# Patient Record
Sex: FEMALE | Race: WHITE | NOT HISPANIC OR LATINO | Employment: FULL TIME | URBAN - METROPOLITAN AREA
[De-identification: names, ages, dates, MRNs, and addresses within clinical notes are randomized per-mention and may not be internally consistent; named-entity substitution may affect disease eponyms.]

---

## 2017-01-06 ENCOUNTER — HOSPITAL ENCOUNTER (EMERGENCY)
Facility: HOSPITAL | Age: 27
Discharge: HOME | End: 2017-01-07
Attending: EMERGENCY MEDICINE
Payer: MEDICAID

## 2017-01-06 DIAGNOSIS — S51.851A CAT BITE OF FOREARM, RIGHT, INITIAL ENCOUNTER: Primary | ICD-10-CM
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
## 2017-01-06 PROCEDURE — 90675 RABIES VACCINE IM: CPT | Performed by: EMERGENCY MEDICINE

## 2017-01-06 PROCEDURE — 96372 THER/PROPH/DIAG INJ SC/IM: CPT

## 2017-01-06 PROCEDURE — 90375 RABIES IG IM/SC: CPT | Performed by: EMERGENCY MEDICINE

## 2017-01-06 PROCEDURE — 90715 TDAP VACCINE 7 YRS/> IM: CPT | Performed by: EMERGENCY MEDICINE

## 2017-01-06 PROCEDURE — 25000003 PHARM REV CODE 250: Performed by: EMERGENCY MEDICINE

## 2017-01-06 PROCEDURE — 90471 IMMUNIZATION ADMIN: CPT | Performed by: EMERGENCY MEDICINE

## 2017-01-06 PROCEDURE — 90472 IMMUNIZATION ADMIN EACH ADD: CPT | Performed by: EMERGENCY MEDICINE

## 2017-01-06 PROCEDURE — 99284 EMERGENCY DEPT VISIT MOD MDM: CPT | Mod: 25

## 2017-01-06 PROCEDURE — 63600175 PHARM REV CODE 636 W HCPCS: Performed by: EMERGENCY MEDICINE

## 2017-01-06 RX ORDER — TRAMADOL HYDROCHLORIDE AND ACETAMINOPHEN 37.5; 325 MG/1; MG/1
1 TABLET, FILM COATED ORAL EVERY 6 HOURS PRN
Qty: 12 TABLET | Refills: 0 | Status: SHIPPED | OUTPATIENT
Start: 2017-01-06 | End: 2017-01-16

## 2017-01-06 RX ORDER — AMOXICILLIN AND CLAVULANATE POTASSIUM 875; 125 MG/1; MG/1
1 TABLET, FILM COATED ORAL EVERY 12 HOURS
Qty: 14 TABLET | Refills: 0 | Status: SHIPPED | OUTPATIENT
Start: 2017-01-06 | End: 2017-01-13

## 2017-01-06 RX ORDER — AMOXICILLIN AND CLAVULANATE POTASSIUM 875; 125 MG/1; MG/1
1 TABLET, FILM COATED ORAL
Status: COMPLETED | OUTPATIENT
Start: 2017-01-06 | End: 2017-01-06

## 2017-01-06 RX ORDER — IBUPROFEN 200 MG
400 TABLET ORAL
Status: COMPLETED | OUTPATIENT
Start: 2017-01-06 | End: 2017-01-06

## 2017-01-06 RX ADMIN — IBUPROFEN 400 MG: 200 TABLET, FILM COATED ORAL at 10:01

## 2017-01-06 RX ADMIN — AMOXICILLIN AND CLAVULANATE POTASSIUM 1 TABLET: 875; 125 TABLET, FILM COATED ORAL at 10:01

## 2017-01-06 RX ADMIN — RABIES IMMUNE GLOBULIN (HUMAN) 1380 UNITS: 150 INJECTION INTRAMUSCULAR at 11:01

## 2017-01-06 RX ADMIN — RABIES VACCINE 2.5 UNITS: KIT at 11:01

## 2017-01-06 RX ADMIN — CLOSTRIDIUM TETANI TOXOID ANTIGEN (FORMALDEHYDE INACTIVATED), CORYNEBACTERIUM DIPHTHERIAE TOXOID ANTIGEN (FORMALDEHYDE INACTIVATED), BORDETELLA PERTUSSIS TOXOID ANTIGEN (GLUTARALDEHYDE INACTIVATED), BORDETELLA PERTUSSIS FILAMENTOUS HEMAGGLUTININ ANTIGEN (FORMALDEHYDE INACTIVATED), BORDETELLA PERTUSSIS PERTACTIN ANTIGEN, AND BORDETELLA PERTUSSIS FIMBRIAE 2/3 ANTIGEN 0.5 ML: 5; 2; 2.5; 5; 3; 5 INJECTION, SUSPENSION INTRAMUSCULAR at 10:01

## 2017-01-06 NOTE — ED AVS SNAPSHOT
[facility]  [address] [address] [address]               Alessandra Sy   2017 10:08 PM   ED    Description:  Female : 1990   Department:  [facility]           Your Care was Coordinated By:     Provider Role From To    Kennedy Singh MD Attending Provider [phone] --      Reason for Visit     Animal Bite           Diagnoses this Visit        Comments    Cat bite of forearm, right, initial encounter    -  Primary       ED Disposition     ED Disposition Condition Comment    Discharge  Make sure to follow up with your primary care provider for further injections of the rabies vaccination as scheduled:  Monday -  -  - 2017    The first symptoms of rabies may be very similar to t hose of the flu including general weakness or discomfort, fever, or headache. These symptoms may last for days. There may be also discomfort or a prickling or itching sensation at the site of bite, progressing within days to symptoms of cerebral dysfunct ion, anxiety, confusion, agitation. As the disease progresses, the person may experience delirium, abnormal behavior, hallucinations, and insomnia.    The acute period of disease typically ends after 2 to 10 days. Once clinical signs of rabies appear, th e disease is nearly always fatal, and treatment is typically supportive.    Disease prevention includes administration of both passive antibody, through an injection of human immune globulin and a round of injections with rabies vaccine (IT'S IMPORTANT T HAT YOU NOTICE THOSE DATES ABOVE TO COMPLETE THE FULL ROUND OF VACCINATIONS).    Once a person begins to exhibit signs of the disease, survival is rare. To date less than 10 documented cases of human survival from clinical rabies have been reported and o nly two have not had a history of pre- or postexposure prophylaxis.             To Do List            Follow-up Information     Follow up with Primary Doctor No In 3 days.        Follow up with [facility].    Specialty:  Emergency Medicine    Why:  If symptoms worsen    Contact information:    [address] [address] [address] [phone]       These Medications        Disp Refills Start End    amoxicillin-clavulanate 875-125mg (AUGMENTIN) 875-125 mg per tablet 14 tablet 0 1/6/2017 1/13/2017    Take 1 tablet by mouth every 12 (twelve) hours. - Oral    Pharmacy: "[facility] [id number] [place] [address] - [address] AT [facility] Ph #: [phone]       tramadol-acetaminophen 37.5-325 mg (ULTRACET) 37.5-325 mg Tab 12 tablet 0 1/6/2017 1/16/2017    Take 1 tablet by mouth every 6 (six) hours as needed for Pain. - Oral    [facility] [id number] [place] [address] - [address] AT [facility] Ph #: [phone]         [facility]     [facility] - 24/7 Assistance  Registered nurses in the [facility] provide clinical advisement, health education, appointment booking, and other advisory services.  Call for this free service at [phone].             Medications           Message regarding Medications     Verify the changes and/or additions to your medication regime listed below are the same as discussed with your clinician today.  If any of these changes or additions are incorrect, please notify your healthcare provider.        START taking these NEW medications        Refills    amoxicillin-clavulanate 875-125mg (AUGMENTIN) 875-125 mg per tablet 0    Sig: Take 1 tablet by mouth every 12 (twelve) hours.    Class: Print    Route: Oral    tramadol-acetaminophen 37.5-325 mg (ULTRACET) 37.5-325 mg Tab 0    Sig: Take 1 tablet by mouth every 6 (six) hours as needed for Pain.    Class: Print    Route: Oral      These medications were administered today        Dose Freq    Tdap vaccine  injection 0.5 mL 0.5 mL ED 1 Time    Sig: Inject 0.5 mLs into the muscle ED 1 Time.    Class: Normal    Route: Intramuscular    amoxicillin-clavulanate 875-125mg per tablet 1 tablet 1 tablet ED 1 Time    Sig: Take 1 tablet by mouth ED 1 Time.    Class: Normal    Route: Oral    ibuprofen tablet 400 mg 400 mg ED 1 Time    Sig: Take 2 tablets (400 mg total) by mouth ED 1 Time.    Class: Normal    Route: Oral    rabies vaccine, PCEC 2.5 unit injection 2.5 Units 1 mL ED 1 Time    Sig: Inject 1 mL (2.5 Units total) into the muscle ED 1 Time.    Class: Normal    Route: Intramuscular    rabies immune globulin (PF) 150 unit/mL injection 1,380 Units 20 Units/kg × 69 kg ED 1 Time    Sig: Inject 9.2 mLs (1,380 Units total) into the muscle ED 1 Time.    Class: Normal    Route: Intramuscular           Verify that the below list of medications is an accurate representation of the medications you are currently taking.  If none reported, the list may be blank. If incorrect, please contact your healthcare provider. Carry this list with you in case of emergency.           Current Medications     amoxicillin-clavulanate 875-125mg (AUGMENTIN) 875-125 mg per tablet Take 1 tablet by mouth every 12 (twelve) hours.    tramadol-acetaminophen 37.5-325 mg (ULTRACET) 37.5-325 mg Tab Take 1 tablet by mouth every 6 (six) hours as needed for Pain.           Clinical Reference Information           Your Vitals Were     BP Pulse Temp Resp Weight Last Period    124/74 (BP Location: Right arm, Patient Position: Sitting) 72 98.2 °F (36.8 °C) (Oral) 18 69 kg (152 lb 3.2 oz) 01/04/2017    SpO2                   98%           Allergies as of 1/6/2017     No Known Allergies      Immunizations Administered on Date of Encounter - 1/6/2017     Name Date Dose VIS Date Route    Rabies - IM 1/6/2017 2.5 Units 10/6/2009 Intramuscular    TDAP 1/6/2017 0.5 mL 2/24/2015 Intramuscular      ED Micro, Lab, POCT     None      ED Imaging Orders     None        Discharge  Instructions         Cat Bite    A cat bite can cause a wound deep enough to break the skin. In such cases, the wound is cleaned and then closed. Sometimes, the wound is not closed completely. This is so that fluid can drain if the wound becomes infected. In addition to wound care, a tetanus shot may be given, if needed.  Home Care  · Wash your hands well with soap and warm water before and after caring for the wound. This helps lower the risk of infection.  · Care for the wound as directed. If a dressing was applied to the wound, be sure to change it as directed.  · If the wound bleeds, place a clean, soft cloth on the wound. Then firmly apply pressure until the bleeding stops. This may take up to 5 minutes. Do not release the pressure and look at the wound during this time.  · Most wounds heal within 10 days. But an infection can occur even with proper treatment. So be sure to check the wound daily for signs of infection (see below).  · Antibiotics may be prescribed. These help prevent or treat infection. If youre given antibiotics, take them as directed. Also be sure to complete the medications.  Rabies Prevention  Rabies is a virus that can be carried in certain animals. These can include domestic animals such as cats and dogs. Pets fully vaccinated against rabies (2 shots) are at very low risk of infection. But because human rabies is almost always fatal, any biting pet should be confined for 10 days as an extra precaution. In general, if there is a risk for rabies, the following steps may need to be taken:  · If someones pet cat has bitten you, it should be kept in a secure area for the next 10 days to watch for signs of illness. (If the pet owner wont allow this, contact your local animal control center.) If the cat becomes ill or dies during that time, contact your local animal control center at once so the animal may be tested for rabies. If the cat stays healthy for the next 10 days, there is no danger  of rabies in the animal or you.  · If a stray cat bit you, contact your local animal control center. They can give information on capture, quarantine, and animal rabies testing.  · If you cant locate the animal that bit you in the next 2 days, and if rabies exists in your region, you may need to receive the rabies vaccine series. Call your health care provider right away. Or, return to the emergency department promptly.  · All animal bites should be reported to the local animal control center. If you were not given a form to fill out, you can report this yourself.  Follow-up care  Follow up with your health care provider, or as directed.  When to seek medical advice  Call your health care provider right away if any of these occur:  · Signs of infection:  ¨ Spreading redness or warmth from the wound  ¨ Increased pain or swelling  ¨ Fever of 100.4ºF (38ºC) or higher, or as directed by your health care provider  ¨ Colored fluid or pus draining from the wound  · Signs of rabies infection:  ¨ Headache  ¨ Confusion  ¨ Strange behavior  ¨ Seizure  · Decreased ability to move any body part near the bite area  · Bleeding that cannot be stopped after 5 minutes of firm pressure  © 6711-9336 [employer]. [address] [place] [address], [place] [address]. All rights reserved. This information is not intended as a substitute for professional medical care. Always follow your healthcare professional's instructions.        Rabies Immune Globulin Solution for injection  What is this medicine?  RABIES IMMUNE GLOBULIN (ray BEES im MYOON GLOB wojciech kenny) is used to prevent rabies infection. Rabies is mostly a disease of animals. Humans may get rabies if they are bitten by animals that have rabies. This medicine is given to someone after they have been exposed.  This medicine may be used for other purposes; ask your health care provider or pharmacist if you have questions.  What should I tell my health care provider before I take  this medicine?  They need to know if you have any of these conditions:  · bleeding disorder  · IgA deficiency  · recently received or scheduled to receive a vaccine  · take medicines that treat or prevent blood clots  · an unusual or allergic reaction to immune globulin, other medicines, foods, dyes, or preservatives  · pregnant or trying to get pregnant  · breast-feeding  How should I use this medicine?  This medicine is for injection into the area around a wound or into a muscle. It is given by a health care professional in a hospital or clinic setting.  A copy of Vaccine Information Statements will be given. Read this sheet carefully each time. The sheet may change frequently.  Talk to your pediatrician regarding the use of this medicine in children. While this drug may be prescribed for children and infants, precautions do apply.  Overdosage: If you think you've taken too much of this medicine contact a poison control center or emergency room at once.  NOTE: This medicine is only for you. Do not share this medicine with others.  What if I miss a dose?  This does not apply.  What may interact with this medicine?  · Live virus vaccines  This list may not describe all possible interactions. Give your health care provider a list of all the medicines, herbs, non-prescription drugs, or dietary supplements you use. Also tell them if you smoke, drink alcohol, or use illegal drugs. Some items may interact with your medicine.  What should I watch for while using this medicine?  This medicine can decrease the response to a vaccine. If you need to get vaccinated, tell your healthcare professional if you have received this medicine within the last 4 months. Extra booster doses may be needed. Talk to your doctor to see if a different vaccination schedule is needed.  This medicine contains products from human blood. It may be possible to pass an infection in this medicine, but no cases have been reported. Talk to your doctor  about the risks and benefits of this medicine.  Your condition will be monitored carefully while you are receiving this medicine.  What side effects may I notice from receiving this medicine?  Side effects that you should report to your doctor or health care professional as soon as possible:  · allergic reactions like skin rash, itching or hives, swelling of the face, lips, or tongue  Side effects that usually do not require medical attention (Report these to your doctor or health care professional if they continue or are bothersome.):  · fever  · headache  · pain, redness, swelling, or irritation at site where injected  This list may not describe all possible side effects. Call your doctor for medical advice about side effects. You may report side effects to FDA at 5-605-OWJ-2067.  Where should I keep my medicine?  This drug is given in a hospital or clinic and will not be stored at home.  NOTE: This sheet is a summary. It may not cover all possible information. If you have questions about this medicine, talk to your doctor, pharmacist, or health care provider.  NOTE:This sheet is a summary. It may not cover all possible information. If you have questions about this medicine, talk to your doctor, pharmacist, or health care provider. Copyright© 2016 [employer]        Understanding Rabies  Rabies is a virus that infects the nerves and the brain. Any warm-blooded animal, including humans, can get it. It is spread in the saliva of an infected animal. Rabies can be treated. Untreated rabies is almost always fatal.  What causes of rabies?  Infections occur when saliva or brain tissue from an infected animal enters another mammals body. This can be through a bite or scrape that breaks the skin. Rarely, infection can be spread through a mucous membrane, such as the eye or mouth.  In [place], raccoons, bats, foxes, and skunks are most likely to carry the rabies virus. Dogs, cats, and ferrets can also get rabies.  Many people have their pets vaccinated. Because of this, pets do not pose a high risk for rabies in this region. This is not the case in other parts of the world.  What are the symptoms of rabies?  In most cases, symptoms of rabies start 1 to 3 months after exposure.  But in some cases, they may not show up for more than a year. First symptoms may include:  · Feeling unwell  · Weakness  · Fatigue  · Low-grade fever  · Nausea or vomiting  · Headache  Over many days to a few weeks more symptoms may develop. These include tingling, itching, numbness, or a burning sensation where the bite occurred.  It is very important to talk with your healthcare provider right away if you think you have been exposed to rabies. Once these symptoms develop, the infection may be far advanced.  The end stages of rabies are almost always fatal. Symptoms at that point include restlessness and confusion, drooling, pain when trying to drink, muscle weakness, numbness, and paralysis.  What to do  · If you have been bitten or scratched by a wild animal, contact your healthcare provider right away.  · If you have been bitten or scratched by a pet, and you do not know if it has had a rabies vaccine, call your local animal control department or your local public health department. The animal will probably be confined with its owner for 10 days. If the animal does not develop rabies in that time, you are not at risk of getting sick.  How is rabies treated?  Treatments focus on reducing the risk of developing rabies after a bite or scratch. They include:  · Cleaning the wound thoroughly. The risk of infection can be reduced by washing the bite or scratch with soap and water, or water and iodine.  · Getting a tetanus vaccine, if you have not had one in 10 years or more. This is to prevent infection with tetanus. This is a bacterium that can cause another serious illness known as lockjaw.  · Getting a series of shots to prevent rabies infection.  These are usually given in the arm, like a flu shot. They include the rabies vaccine and the human rabies antibody to help your body fight the virus. This treatment can work even after rabies has entered the body, but not after symptoms begin.  What are the possible complications of rabies?  Rabies infections that are not treated almost always result in death. By the time symptoms of rabies appear, it is usually too late for treatment to work. If you think you have been exposed to rabies, it is very important to talk with your healthcare provider right away. He or she will tell you whether you should be treated.  How can I prevent rabies?   If you dont take unnecessary risks, you dont need to be afraid of getting rabies. To avoid exposure:  · Dont handle wild animals or stray dogs or cats. Report strays and ill animals to animal control.  · Vaccinate your pets. Urge your neighbors to do the same.  · Try to limit your pets exposure to wild animals.  · If you are traveling to other countries, ask your healthcare provider or a travel clinic about vaccines you should have.  When should I call my healthcare provider?  Call your healthcare provider right away if you have any of these:  · A bite or scratch from a wild animal that has broken the skin. This might be from a raccoon or skunk.  · A bite or scratch from a pet animal that has broken the skin, and you cannot be sure that it has had a current rabies vaccine.  · You or your child may have been scratched or bitten by a bat. Bites and scratches from bats may not be noticed, especially by children.   © 3708-3137 [employer]. [address] [place] [address], [place] [address]. All rights reserved. This information is not intended as a substitute for professional medical care. Always follow your healthcare professional's instructions.          Discharge References/Attachments     CAT BITE (ENGLISH)      [employer] Sign-Up     Activating your [facility] account is  as easy as 1-2-3!     1) Visit [email or web address], select Sign Up Now, enter this activation code and your date of birth, then select Next.  [email or web address]  Expires: 2/20/2017 11:46 PM      2) Create a username and password to use when you visit [facility] in the future and select a security question in case you lose your password and select Next.    3) Enter your e-mail address and click Sign Up!    Additional Information  If you have questions, please e-mail [email or web address] or call [phone] to talk to our [facility] staff. Remember, [facility] is NOT to be used for urgent needs. For medical emergencies, dial 911.         Smoking Cessation     If you would like to quit smoking:   You may be eligible for free services if you are a [place] resident and started smoking cigarettes before September 1, 1988.  Call the Smoking Cessation Trust ([facility]) toll free at (225) 388-9403 or [phone] 460-0009.   Call 7-110-QUIT-NOW if you do not meet the above criteria.             [facility] complies with applicable Federal civil rights laws and does not discriminate on the basis of race, color, national origin, age, disability, or sex.        Language Assistance Services     ATTENTION: Language assistance services are available, free of charge. Please call [phone].      ATENCIÓN: Si habla español, tiene a pacheco disposición servicios gratuitos de asistencia lingüística. Llame al [phone].     CHÚ Ý: N?u b?n nói Ti?ng Vi?t, có các d?ch v? h? tr? ngôn ng? mi?n phí dành cho b?n. G?i s? [phone].

## 2017-01-06 NOTE — Clinical Note
Make sure to follow up with your primary care provider for further injections of the rabies vaccination as scheduled:  Monday - January 9, 2017 Friday - January 13, 2017 Friday - January 20, 2017    The first symptoms of rabies may be very similar to t hose of the flu including general weakness or discomfort, fever, or headache. These symptoms may last for days. There may be also discomfort or a prickling or itching sensation at the site of bite, progressing within days to symptoms of cerebral dysfunct ion, anxiety, confusion, agitation. As the disease progresses, the person may experience delirium, abnormal behavior, hallucinations, and insomnia.    The acute period of disease typically ends after 2 to 10 days. Once clinical signs of rabies appear, th e disease is nearly always fatal, and treatment is typically supportive.    Disease prevention includes administration of both passive antibody, through an injection of human immune globulin and a round of injections with rabies vaccine (IT'S IMPORTANT T HAT YOU NOTICE THOSE DATES ABOVE TO COMPLETE THE FULL ROUND OF VACCINATIONS).    Once a person begins to exhibit signs of the disease, survival is rare. To date less than 10 documented cases of human survival from clinical rabies have been reported and o nly two have not had a history of pre- or postexposure prophylaxis.

## 2017-01-07 VITALS
HEART RATE: 72 BPM | SYSTOLIC BLOOD PRESSURE: 121 MMHG | DIASTOLIC BLOOD PRESSURE: 75 MMHG | WEIGHT: 152.19 LBS | TEMPERATURE: 98 F | OXYGEN SATURATION: 98 % | RESPIRATION RATE: 18 BRPM

## 2017-01-07 NOTE — ED NOTES
Pt reports mother cat and 2 kittens at [address] at the [facility] in [facility]. Bite occurred yesterday.

## 2017-01-07 NOTE — DISCHARGE INSTRUCTIONS
Cat Bite    A cat bite can cause a wound deep enough to break the skin. In such cases, the wound is cleaned and then closed. Sometimes, the wound is not closed completely. This is so that fluid can drain if the wound becomes infected. In addition to wound care, a tetanus shot may be given, if needed.  Home Care  · Wash your hands well with soap and warm water before and after caring for the wound. This helps lower the risk of infection.  · Care for the wound as directed. If a dressing was applied to the wound, be sure to change it as directed.  · If the wound bleeds, place a clean, soft cloth on the wound. Then firmly apply pressure until the bleeding stops. This may take up to 5 minutes. Do not release the pressure and look at the wound during this time.  · Most wounds heal within 10 days. But an infection can occur even with proper treatment. So be sure to check the wound daily for signs of infection (see below).  · Antibiotics may be prescribed. These help prevent or treat infection. If youre given antibiotics, take them as directed. Also be sure to complete the medications.  Rabies Prevention  Rabies is a virus that can be carried in certain animals. These can include domestic animals such as cats and dogs. Pets fully vaccinated against rabies (2 shots) are at very low risk of infection. But because human rabies is almost always fatal, any biting pet should be confined for 10 days as an extra precaution. In general, if there is a risk for rabies, the following steps may need to be taken:  · If someones pet cat has bitten you, it should be kept in a secure area for the next 10 days to watch for signs of illness. (If the pet owner wont allow this, contact your local animal control center.) If the cat becomes ill or dies during that time, contact your local animal control center at once so the animal may be tested for rabies. If the cat stays healthy for the next 10 days, there is no danger of rabies in the  animal or you.  · If a stray cat bit you, contact your local animal control center. They can give information on capture, quarantine, and animal rabies testing.  · If you cant locate the animal that bit you in the next 2 days, and if rabies exists in your region, you may need to receive the rabies vaccine series. Call your health care provider right away. Or, return to the emergency department promptly.  · All animal bites should be reported to the local animal control center. If you were not given a form to fill out, you can report this yourself.  Follow-up care  Follow up with your health care provider, or as directed.  When to seek medical advice  Call your health care provider right away if any of these occur:  · Signs of infection:  ¨ Spreading redness or warmth from the wound  ¨ Increased pain or swelling  ¨ Fever of 100.4ºF (38ºC) or higher, or as directed by your health care provider  ¨ Colored fluid or pus draining from the wound  · Signs of rabies infection:  ¨ Headache  ¨ Confusion  ¨ Strange behavior  ¨ Seizure  · Decreased ability to move any body part near the bite area  · Bleeding that cannot be stopped after 5 minutes of firm pressure  © 7485-5994 [employer]. [address] [place] [address]. All rights reserved. This information is not intended as a substitute for professional medical care. Always follow your healthcare professional's instructions.        Rabies Immune Globulin Solution for injection  What is this medicine?  RABIES IMMUNE GLOBULIN (ray BEES im MYOON GLOB wojciech kenny) is used to prevent rabies infection. Rabies is mostly a disease of animals. Humans may get rabies if they are bitten by animals that have rabies. This medicine is given to someone after they have been exposed.  This medicine may be used for other purposes; ask your health care provider or pharmacist if you have questions.  What should I tell my health care provider before I take this medicine?  They  need to know if you have any of these conditions:  · bleeding disorder  · IgA deficiency  · recently received or scheduled to receive a vaccine  · take medicines that treat or prevent blood clots  · an unusual or allergic reaction to immune globulin, other medicines, foods, dyes, or preservatives  · pregnant or trying to get pregnant  · breast-feeding  How should I use this medicine?  This medicine is for injection into the area around a wound or into a muscle. It is given by a health care professional in a hospital or clinic setting.  A copy of Vaccine Information Statements will be given. Read this sheet carefully each time. The sheet may change frequently.  Talk to your pediatrician regarding the use of this medicine in children. While this drug may be prescribed for children and infants, precautions do apply.  Overdosage: If you think you've taken too much of this medicine contact a poison control center or emergency room at once.  NOTE: This medicine is only for you. Do not share this medicine with others.  What if I miss a dose?  This does not apply.  What may interact with this medicine?  · Live virus vaccines  This list may not describe all possible interactions. Give your health care provider a list of all the medicines, herbs, non-prescription drugs, or dietary supplements you use. Also tell them if you smoke, drink alcohol, or use illegal drugs. Some items may interact with your medicine.  What should I watch for while using this medicine?  This medicine can decrease the response to a vaccine. If you need to get vaccinated, tell your healthcare professional if you have received this medicine within the last 4 months. Extra booster doses may be needed. Talk to your doctor to see if a different vaccination schedule is needed.  This medicine contains products from human blood. It may be possible to pass an infection in this medicine, but no cases have been reported. Talk to your doctor about the risks and  benefits of this medicine.  Your condition will be monitored carefully while you are receiving this medicine.  What side effects may I notice from receiving this medicine?  Side effects that you should report to your doctor or health care professional as soon as possible:  · allergic reactions like skin rash, itching or hives, swelling of the face, lips, or tongue  Side effects that usually do not require medical attention (Report these to your doctor or health care professional if they continue or are bothersome.):  · fever  · headache  · pain, redness, swelling, or irritation at site where injected  This list may not describe all possible side effects. Call your doctor for medical advice about side effects. You may report side effects to FDA at 4-094-VPX-6932.  Where should I keep my medicine?  This drug is given in a hospital or clinic and will not be stored at home.  NOTE: This sheet is a summary. It may not cover all possible information. If you have questions about this medicine, talk to your doctor, pharmacist, or health care provider.  NOTE:This sheet is a summary. It may not cover all possible information. If you have questions about this medicine, talk to your doctor, pharmacist, or health care provider. Copyright© 2016 [employer]        Understanding Rabies  Rabies is a virus that infects the nerves and the brain. Any warm-blooded animal, including humans, can get it. It is spread in the saliva of an infected animal. Rabies can be treated. Untreated rabies is almost always fatal.  What causes of rabies?  Infections occur when saliva or brain tissue from an infected animal enters another mammals body. This can be through a bite or scrape that breaks the skin. Rarely, infection can be spread through a mucous membrane, such as the eye or mouth.  In [place], raccoons, bats, foxes, and skunks are most likely to carry the rabies virus. Dogs, cats, and ferrets can also get rabies. Many people have  their pets vaccinated. Because of this, pets do not pose a high risk for rabies in this region. This is not the case in other parts of the world.  What are the symptoms of rabies?  In most cases, symptoms of rabies start 1 to 3 months after exposure.  But in some cases, they may not show up for more than a year. First symptoms may include:  · Feeling unwell  · Weakness  · Fatigue  · Low-grade fever  · Nausea or vomiting  · Headache  Over many days to a few weeks more symptoms may develop. These include tingling, itching, numbness, or a burning sensation where the bite occurred.  It is very important to talk with your healthcare provider right away if you think you have been exposed to rabies. Once these symptoms develop, the infection may be far advanced.  The end stages of rabies are almost always fatal. Symptoms at that point include restlessness and confusion, drooling, pain when trying to drink, muscle weakness, numbness, and paralysis.  What to do  · If you have been bitten or scratched by a wild animal, contact your healthcare provider right away.  · If you have been bitten or scratched by a pet, and you do not know if it has had a rabies vaccine, call your local animal control department or your local public health department. The animal will probably be confined with its owner for 10 days. If the animal does not develop rabies in that time, you are not at risk of getting sick.  How is rabies treated?  Treatments focus on reducing the risk of developing rabies after a bite or scratch. They include:  · Cleaning the wound thoroughly. The risk of infection can be reduced by washing the bite or scratch with soap and water, or water and iodine.  · Getting a tetanus vaccine, if you have not had one in 10 years or more. This is to prevent infection with tetanus. This is a bacterium that can cause another serious illness known as lockjaw.  · Getting a series of shots to prevent rabies infection. These are usually  given in the arm, like a flu shot. They include the rabies vaccine and the human rabies antibody to help your body fight the virus. This treatment can work even after rabies has entered the body, but not after symptoms begin.  What are the possible complications of rabies?  Rabies infections that are not treated almost always result in death. By the time symptoms of rabies appear, it is usually too late for treatment to work. If you think you have been exposed to rabies, it is very important to talk with your healthcare provider right away. He or she will tell you whether you should be treated.  How can I prevent rabies?   If you dont take unnecessary risks, you dont need to be afraid of getting rabies. To avoid exposure:  · Dont handle wild animals or stray dogs or cats. Report strays and ill animals to animal control.  · Vaccinate your pets. Urge your neighbors to do the same.  · Try to limit your pets exposure to wild animals.  · If you are traveling to other countries, ask your healthcare provider or a travel clinic about vaccines you should have.  When should I call my healthcare provider?  Call your healthcare provider right away if you have any of these:  · A bite or scratch from a wild animal that has broken the skin. This might be from a raccoon or skunk.  · A bite or scratch from a pet animal that has broken the skin, and you cannot be sure that it has had a current rabies vaccine.  · You or your child may have been scratched or bitten by a bat. Bites and scratches from bats may not be noticed, especially by children.   © 3422-7731 [employer]. [address] [place] [address], [place] [address]. All rights reserved. This information is not intended as a substitute for professional medical care. Always follow your healthcare professional's instructions.

## 2017-01-07 NOTE — ED NOTES
Pt aaox4. Resp e/u. Skin warm and dry with R FA scratch and R thumb puncture noted. No drainage. No redness. Pt interacting appropriately. Ambulatory, steady gait  Noted.

## 2017-01-07 NOTE — ED PROVIDER NOTES
Encounter Date: 1/6/2017       History     Chief Complaint   Patient presents with    Animal Bite     cat bite to right arm yesterday     Review of patient's allergies indicates:  No Known Allergies  Patient is a 26 y.o. female presenting with the following complaint: animal bite. The history is provided by the patient.   Animal Bite    The incident occurred yesterday. The incident occurred at another residence. She came to the ER via walk-in. There is an injury to the right forearm and right hand. There is an injury to the right thumb. The pain is at a severity of 8/10. It is unlikely that a foreign body is present. Associated symptoms include difficulty breathing. Pertinent negatives include no altered mental status, no numbness, no nausea, no vomiting, no headaches and no focal weakness. There have been no prior injuries to these areas. She is right-handed. She has received no recent medical care. Services received include medications given.     History reviewed. No pertinent past medical history.  No past medical history pertinent negatives.  History reviewed. No pertinent past surgical history.  History reviewed. No pertinent family history.  Social History   Substance Use Topics    Smoking status: Current Every Day Smoker     Packs/day: 0.50     Types: Cigarettes    Smokeless tobacco: None    Alcohol use No     Review of Systems   Gastrointestinal: Negative for nausea and vomiting.   Skin:        Puncture wound to right thumb and small scratch to right forearm   Neurological: Negative for focal weakness, numbness and headaches.   All other systems reviewed and are negative.      Physical Exam   Initial Vitals   BP Pulse Resp Temp SpO2   01/06/17 2208 01/06/17 2208 01/06/17 2208 01/06/17 2208 01/06/17 2208   124/74 72 18 98.2 °F (36.8 °C) 98 %     Physical Exam    Nursing note and vitals reviewed.  Constitutional: She appears well-developed and well-nourished. No distress.   HENT:   Head: Normocephalic and  "atraumatic.   Cardiovascular: Normal rate, regular rhythm and intact distal pulses.   Pulmonary/Chest: No respiratory distress.   Musculoskeletal: Normal range of motion.   Neurological: She is alert and oriented to person, place, and time. She has normal strength.   Skin: Skin is warm and dry. No erythema.   Very superficial scratch to right flexor forearm with very slight peripheral bruising. Tiny puncture to right thumb x2 with NO erythema and mild swelling to MCP joint. Full ROM.         ED Course   Procedures  Labs Reviewed - No data to display     Animal control notified (Closed!)  With patient description of a kitten that was acting strange, foaming at mouth with bite, appearing malnourished and "acting strange", the risk/benefit warrants Rabies prophylaxis and will give HRIG and vaccine starting tonight.    To receive Rabavert vaccine day 3 (January 9), day 7 (January 13), and day 14 (January20)      Approx 0.5ml of Immunoglobulin injected into area of right thumb puncture sites. No NV compromise.              ED Course     Clinical Impression:             ICD-10-CM ICD-9-CM   1. Cat bite of forearm, right, initial encounter S51.851A 881.00    W55.01XA E906.3          Kennedy Singh MD  01/07/17 0234    "

## 2017-02-14 ENCOUNTER — HOSPITAL ENCOUNTER (EMERGENCY)
Facility: HOSPITAL | Age: 27
Discharge: HOME | End: 2017-02-14
Attending: EMERGENCY MEDICINE
Payer: MEDICAID

## 2017-02-14 DIAGNOSIS — Z72.0 TOBACCO ABUSE: ICD-10-CM

## 2017-02-14 DIAGNOSIS — K52.9 GASTROENTERITIS: Primary | ICD-10-CM

## 2017-02-14 LAB
ALBUMIN SERPL BCP-MCNC: 3.8 G/DL
ALP SERPL-CCNC: 50 U/L
ALT SERPL W/O P-5'-P-CCNC: 10 U/L
AMYLASE SERPL-CCNC: 38 U/L
ANION GAP SERPL CALC-SCNC: 12 MMOL/L
AST SERPL-CCNC: 18 U/L
B-HCG UR QL: NEGATIVE
BASOPHILS # BLD AUTO: 0.03 K/UL
BASOPHILS NFR BLD: 0.4 %
BILIRUB SERPL-MCNC: 0.5 MG/DL
BILIRUB UR QL STRIP: NEGATIVE
BUN SERPL-MCNC: 9 MG/DL
CALCIUM SERPL-MCNC: 8.5 MG/DL
CHLORIDE SERPL-SCNC: 105 MMOL/L
CLARITY UR REFRACT.AUTO: CLEAR
CO2 SERPL-SCNC: 22 MMOL/L
COLOR UR AUTO: YELLOW
CREAT SERPL-MCNC: 0.8 MG/DL
DIFFERENTIAL METHOD: ABNORMAL
EOSINOPHIL # BLD AUTO: 0.3 K/UL
EOSINOPHIL NFR BLD: 3.8 %
ERYTHROCYTE [DISTWIDTH] IN BLOOD BY AUTOMATED COUNT: 13.5 %
EST. GFR  (AFRICAN AMERICAN): >60 ML/MIN/1.73 M^2
EST. GFR  (NON AFRICAN AMERICAN): >60 ML/MIN/1.73 M^2
GLUCOSE SERPL-MCNC: 92 MG/DL
GLUCOSE UR QL STRIP: NEGATIVE
HCT VFR BLD AUTO: 42.5 %
HGB BLD-MCNC: 14.4 G/DL
HGB UR QL STRIP: NEGATIVE
KETONES UR QL STRIP: NEGATIVE
LEUKOCYTE ESTERASE UR QL STRIP: NEGATIVE
LIPASE SERPL-CCNC: 20 U/L
LYMPHOCYTES # BLD AUTO: 2.5 K/UL
LYMPHOCYTES NFR BLD: 31 %
MCH RBC QN AUTO: 31.1 PG
MCHC RBC AUTO-ENTMCNC: 33.9 %
MCV RBC AUTO: 92 FL
MONOCYTES # BLD AUTO: 1 K/UL
MONOCYTES NFR BLD: 12.7 %
NEUTROPHILS # BLD AUTO: 4.2 K/UL
NEUTROPHILS NFR BLD: 51.9 %
NITRITE UR QL STRIP: NEGATIVE
PH UR STRIP: 7 [PH] (ref 5–8)
PLATELET # BLD AUTO: 340 K/UL
PMV BLD AUTO: 8.9 FL
POTASSIUM SERPL-SCNC: 3.7 MMOL/L
PROT SERPL-MCNC: 7 G/DL
PROT UR QL STRIP: NEGATIVE
RBC # BLD AUTO: 4.63 M/UL
SODIUM SERPL-SCNC: 139 MMOL/L
SP GR UR STRIP: 1.02 (ref 1–1.03)
URN SPEC COLLECT METH UR: ABNORMAL
UROBILINOGEN UR STRIP-ACNC: ABNORMAL EU/DL
WBC # BLD AUTO: 8.06 K/UL

## 2017-02-14 PROCEDURE — 81003 URINALYSIS AUTO W/O SCOPE: CPT

## 2017-02-14 PROCEDURE — 96361 HYDRATE IV INFUSION ADD-ON: CPT

## 2017-02-14 PROCEDURE — 85025 COMPLETE CBC W/AUTO DIFF WBC: CPT

## 2017-02-14 PROCEDURE — 96365 THER/PROPH/DIAG IV INF INIT: CPT

## 2017-02-14 PROCEDURE — 81025 URINE PREGNANCY TEST: CPT

## 2017-02-14 PROCEDURE — 25000003 PHARM REV CODE 250: Performed by: EMERGENCY MEDICINE

## 2017-02-14 PROCEDURE — 99283 EMERGENCY DEPT VISIT LOW MDM: CPT | Mod: 25

## 2017-02-14 PROCEDURE — 80053 COMPREHEN METABOLIC PANEL: CPT

## 2017-02-14 PROCEDURE — 82150 ASSAY OF AMYLASE: CPT

## 2017-02-14 PROCEDURE — 83690 ASSAY OF LIPASE: CPT

## 2017-02-14 PROCEDURE — 63600175 PHARM REV CODE 636 W HCPCS: Performed by: EMERGENCY MEDICINE

## 2017-02-14 RX ORDER — ONDANSETRON 4 MG/1
4 TABLET, ORALLY DISINTEGRATING ORAL ONCE
Status: COMPLETED | OUTPATIENT
Start: 2017-02-14 | End: 2017-02-14

## 2017-02-14 RX ORDER — PROMETHAZINE HYDROCHLORIDE 12.5 MG/1
12.5 TABLET ORAL EVERY 6 HOURS PRN
Qty: 12 TABLET | Refills: 0 | Status: SHIPPED | OUTPATIENT
Start: 2017-02-14 | End: 2017-02-17

## 2017-02-14 RX ADMIN — ONDANSETRON 4 MG: 4 TABLET, ORALLY DISINTEGRATING ORAL at 07:02

## 2017-02-14 RX ADMIN — PROMETHAZINE HYDROCHLORIDE 12.5 MG: 25 INJECTION, SOLUTION INTRAMUSCULAR; INTRAVENOUS at 09:02

## 2017-02-14 RX ADMIN — SODIUM CHLORIDE 1000 ML: 0.9 INJECTION, SOLUTION INTRAVENOUS at 07:02

## 2017-02-14 NOTE — ED AVS SNAPSHOT
[facility]  [address] [address] [address]               Alessandra Sy   2017  7:04 PM   ED    Description:  Female : 1990   Department:  [facility]           Your Care was Coordinated By:     Provider Role From To    Ar Vera MD Attending Provider 17 0751 --      Reason for Visit     Abdominal Pain     Emesis     Diarrhea           Diagnoses this Visit        Comments    Gastroenteritis    -  Primary     Tobacco abuse           ED Disposition     ED Disposition Condition Comment    Discharge  Home           To Do List           Follow-up Information     Follow up with PCP. Schedule an appointment as soon as possible for a visit in 3 days.       These Medications        Disp Refills Start End    promethazine (PHENERGAN) 12.5 MG Tab 12 tablet 0 2017    Take 1 tablet (12.5 mg total) by mouth every 6 (six) hours as needed for Nausea. - Oral    Pharmacy: [facility] [address] [place] [address] [address] AT [facility] Ph #: [phone]         [facility]     [facility] Nurse Care Line -  Assistance  Registered nurses in the [facility] provide clinical advisement, health education, appointment booking, and other advisory services.  Call for this free service at [phone].             Medications           Message regarding Medications     Verify the changes and/or additions to your medication regime listed below are the same as discussed with your clinician today.  If any of these changes or additions are incorrect, please notify your healthcare provider.        START taking these NEW medications        Refills    promethazine (PHENERGAN) 12.5 MG Tab 0    Sig: Take 1 tablet (12.5 mg total) by mouth every 6 (six) hours as needed for Nausea.    Class: Normal    Route: Oral      These medications were administered today        Dose Freq    ondansetron  disintegrating tablet 4 mg 4 mg Once    Sig: Take 1 tablet (4 mg total) by mouth once.    Class: Normal    Route: Oral    sodium chloride 0.9% bolus 1,000 mL 1,000 mL Once    Sig: Inject 1,000 mLs into the vein once.    Class: Normal    Route: Intravenous    promethazine (PHENERGAN) 12.5 mg in dextrose 5 % 50 mL IVPB 12.5 mg ED 1 Time    Sig: Inject 12.5 mg into the vein ED 1 Time.    Class: Normal    Route: Intravenous           Verify that the below list of medications is an accurate representation of the medications you are currently taking.  If none reported, the list may be blank. If incorrect, please contact your healthcare provider. Carry this list with you in case of emergency.           Current Medications     promethazine (PHENERGAN) 12.5 MG Tab Take 1 tablet (12.5 mg total) by mouth every 6 (six) hours as needed for Nausea.           Clinical Reference Information           Your Vitals Were     BP Pulse Temp Resp Weight Last Period    119/79 82 98 °F (36.7 °C) 18 66.7 kg (147 lb) 02/02/2017    SpO2                   100%           Allergies as of 2/14/2017     No Known Allergies      Immunizations Administered on Date of Encounter - 2/14/2017     None      ED Micro, Lab, POCT     Start Ordered       Status Ordering Provider    02/14/17 1943 02/14/17 1942  Amylase  STAT      Final result     02/14/17 1943 02/14/17 1942  Lipase  STAT      Final result     02/14/17 1943 02/14/17 1942  CBC auto differential  STAT      Final result     02/14/17 1943 02/14/17 1942  Comprehensive metabolic panel  STAT      Final result     02/14/17 1919 02/14/17 1918  Rapid Pregnancy, Urine  STAT      Final result     02/14/17 1918 02/14/17 1918  Urinalysis Clean Catch  STAT      Final result       ED Imaging Orders     None      Discharge References/Attachments     GASTROENTERITIS, VIRAL (ADULT) (ENGLISH)      [facility] Sign-Up     Activating your [facility] account is as easy as 1-2-3!     1) Visit [email or web address], select Sign  Up Now, enter this activation code and your date of birth, then select Next.  [email or web address]  Expires: 2/20/2017 11:46 PM      2) Create a username and password to use when you visit [facility] in the future and select a security question in case you lose your password and select Next.    3) Enter your e-mail address and click Sign Up!    Additional Information  If you have questions, please e-mail [email or web address] or call [phone] to talk to our [facility] staff. Remember, [facility] is NOT to be used for urgent needs. For medical emergencies, dial 911.         Smoking Cessation     If you would like to quit smoking:   You may be eligible for free services if you are a [place] resident and started smoking cigarettes before September 1, 1988.  Call the Smoking Cessation Trust (SCT) toll free at (070) 635-3517 or (958) 691-6913.   Call 2-815-QUIT-NOW if you do not meet the above criteria.             [facility] complies with applicable Federal civil rights laws and does not discriminate on the basis of race, color, national origin, age, disability, or sex.        Language Assistance Services     ATTENTION: Language assistance services are available, free of charge. Please call [phone].      ATENCIÓN: Si habla español, tiene a pacheco disposición servicios gratuitos de asistencia lingüística. Llame al [phone].     CHÚ Ý: N?u b?n nói Ti?ng Vi?t, có các d?ch v? h? tr? ngôn ng? mi?n phí dành cho b?n. G?i s? [phone].

## 2017-02-15 VITALS
DIASTOLIC BLOOD PRESSURE: 73 MMHG | RESPIRATION RATE: 18 BRPM | SYSTOLIC BLOOD PRESSURE: 122 MMHG | TEMPERATURE: 98 F | OXYGEN SATURATION: 99 % | WEIGHT: 147 LBS | HEART RATE: 74 BPM

## 2017-02-15 NOTE — ED NOTES
Pt resting in bed. NAD, VSS, RR equal and unlabored. Will continue to monitorPatient verbally verified and Spelled Full Name and Date of Birth  C/O nausea, vomiting & diarrhea since yesterday & pain to left lower abd quad.  Diarrhea has improved but the vomiting has persisted.  Child in home ill with same thing. LOC: The patient is awake, alert and aware of environment with an appropriate affect, the patient is oriented x 3 and speaking appropriately.  APPEARANCE: Patient resting comfortably and in no acute distress, patient is clean and well groomed, patient's clothing is properly fastened.  HEENT: Brief WNL  SKIN: Brief WNL.   MUSCULOSKELETAL: Brief WNL  RESPIRATORY: Brief WNL, chest clear sawyer, denies cough or cold  CARDIAC: Sinus at 78/min  GASTRO: abdomen soft, slight tenderness to left lower quad but states entire stomach hurts. BS x 4 quads  : Brief WNL except urine is dark, denies vaginal discharge, normal menstrual cycles  Peripheral Vasc: Brief WNL  NEURO: Brief WNL  PSYCH: Brief WNL

## 2017-02-15 NOTE — ED PROVIDER NOTES
Encounter Date: 2/14/2017       History     Chief Complaint   Patient presents with    Abdominal Pain     constant pain since yesterday morning,  LUQ pain and LLQ pain,     Emesis     since yesterday, last episode at 1600 after eating soup    Diarrhea     since yesterday, last episode at 1600 after eating soup, watery stool     Review of patient's allergies indicates:  No Known Allergies  HPI Comments: Patient currently presents with chief complaint of nausea and vomiting.  Onset noted 1 day ago.  There have been roughly 5-10 bouts of emesis and multiple episodes of associated diarrhea.  Patient denies fever.  Patient denies sustained abdominal pain.  Patient denies urinary symptoms.  There is not blood in the stools.      The history is provided by the patient.     History reviewed. No pertinent past medical history.  No past medical history pertinent negatives.  History reviewed. No pertinent past surgical history.  History reviewed. No pertinent family history.  Social History   Substance Use Topics    Smoking status: Current Every Day Smoker     Packs/day: 0.50     Types: Cigarettes    Smokeless tobacco: None    Alcohol use No     Review of Systems   Constitutional: Negative for chills and fever.   HENT: Negative for congestion and rhinorrhea.    Respiratory: Negative for cough, chest tightness, shortness of breath and wheezing.    Cardiovascular: Negative for chest pain, palpitations and leg swelling.   Gastrointestinal: Positive for abdominal pain (cramping), diarrhea, nausea and vomiting. Negative for abdominal distention and constipation.   Genitourinary: Negative for dysuria, frequency, urgency, vaginal bleeding and vaginal discharge.   Skin: Negative for color change and rash.   Allergic/Immunologic: Negative for immunocompromised state.   Neurological: Negative for dizziness, weakness and numbness.   Hematological: Negative for adenopathy. Does not bruise/bleed easily.   All other systems reviewed  and are negative.      Physical Exam   Initial Vitals   BP Pulse Resp Temp SpO2   02/14/17 1902 02/14/17 1902 02/14/17 1902 02/14/17 1902 02/14/17 1902   113/64 77 18 98.4 °F (36.9 °C) 99 %     Physical Exam    Nursing note and vitals reviewed.  Constitutional: She appears well-developed and well-nourished. She is not diaphoretic. No distress.   HENT:   Head: Normocephalic and atraumatic.   Right Ear: External ear normal.   Left Ear: External ear normal.   Nose: Nose normal.   Mouth/Throat: Oropharynx is clear and moist.   Eyes: Conjunctivae and EOM are normal. Pupils are equal, round, and reactive to light. No scleral icterus.   Neck: Neck supple. No JVD present.   Cardiovascular: Normal rate, regular rhythm, normal heart sounds and intact distal pulses. Exam reveals no gallop and no friction rub.    No murmur heard.  Pulmonary/Chest: Breath sounds normal. She has no wheezes. She has no rhonchi. She has no rales.   Abdominal: Soft. Bowel sounds are normal. She exhibits no distension. There is tenderness (mild diffuse tenderness).   Musculoskeletal: Normal range of motion.   Neurological: She is alert and oriented to person, place, and time. She has normal strength. No cranial nerve deficit or sensory deficit.   Skin: Skin is warm and dry. No rash noted.   Psychiatric: She has a normal mood and affect. Her behavior is normal.         ED Course   Procedures  Labs Reviewed   URINALYSIS - Abnormal; Notable for the following:        Result Value    Urobilinogen, UA 4.0-6.0 (*)     All other components within normal limits   CBC W/ AUTO DIFFERENTIAL - Abnormal; Notable for the following:     MCH 31.1 (*)     MPV 8.9 (*)     All other components within normal limits   COMPREHENSIVE METABOLIC PANEL - Abnormal; Notable for the following:     CO2 22 (*)     Calcium 8.5 (*)     Alkaline Phosphatase 50 (*)     All other components within normal limits   PREGNANCY TEST, URINE RAPID   AMYLASE   LIPASE             Medical  Decision Making:   Initial Assessment:   All historical and physical findings were reviewed with the patient in detail.  Patient was advised of a diagnosis of acute viral gastroenteritis.  Patient was advised to maintain generous hydration and bland oral intake with the use of antiemetics.  Patient was also counseled regarding use of kaopectate for management of diarrhea should it become necessary.  All remaining questions and concerns were addressed at that time.  Patient has been counseled regarding the need for follow-up as well as the indication to return to the emergency room should new or worrisome developments occur.  Ar Vera MD  10:28 PM    Patient counseled regarding the general dangers of ongoing tobacco abuse as well as specific risks as they pertain to the patient's current medical history.  Methods of cessation briefly reviewed with the patient.  Patient advised to contact the PCP for outpatient management and monitoring of this process.                       ED Course     Clinical Impression:   The primary encounter diagnosis was Gastroenteritis. A diagnosis of Tobacco abuse was also pertinent to this visit.          Ar Vera MD  02/14/17 8871

## 2017-04-26 ENCOUNTER — HOSPITAL ENCOUNTER (EMERGENCY)
Facility: HOSPITAL | Age: 27
Discharge: HOME | End: 2017-04-26
Attending: EMERGENCY MEDICINE
Payer: MEDICAID

## 2017-04-26 VITALS
TEMPERATURE: 98 F | RESPIRATION RATE: 18 BRPM | OXYGEN SATURATION: 100 % | HEART RATE: 108 BPM | DIASTOLIC BLOOD PRESSURE: 85 MMHG | BODY MASS INDEX: 24.33 KG/M2 | WEIGHT: 155 LBS | SYSTOLIC BLOOD PRESSURE: 131 MMHG | HEIGHT: 67 IN

## 2017-04-26 DIAGNOSIS — S93.505A: Primary | ICD-10-CM

## 2017-04-26 PROCEDURE — 99283 EMERGENCY DEPT VISIT LOW MDM: CPT

## 2017-04-26 RX ORDER — NAPROXEN 500 MG/1
500 TABLET ORAL 2 TIMES DAILY WITH MEALS
Qty: 20 TABLET | Refills: 0 | Status: SHIPPED | OUTPATIENT
Start: 2017-04-26 | End: 2017-05-11

## 2017-04-26 RX ORDER — NAPROXEN 500 MG/1
500 TABLET ORAL 2 TIMES DAILY WITH MEALS
Qty: 20 TABLET | Refills: 0 | Status: SHIPPED | OUTPATIENT
Start: 2017-04-26 | End: 2017-04-26

## 2017-04-26 NOTE — ED AVS SNAPSHOT
[facility]  [address] [address] [address]               Alessandra Sy   2017  9:12 PM   ED    Description:  Female : 1990   Department:  [facility]           Your Care was Coordinated By:     Provider Role From To    Ar Vera MD Attending Provider 17 [id number] --      Reason for Visit     Foot Pain           Diagnoses this Visit        Comments    Sprain of toe, fifth, left, initial encounter    -  Primary       ED Disposition     ED Disposition Condition Comment    Discharge  Home           To Do List           Follow-up Information     Follow up with PCP. Schedule an appointment as soon as possible for a visit in 1 week.       These Medications        Disp Refills Start End    naproxen (NAPROSYN) 500 MG tablet 20 tablet 0 2017     Take 1 tablet (500 mg total) by mouth 2 (two) times daily with meals. - Oral    Pharmacy: [facility] [address] [place] [address] [address] AT [facility] Ph #: [phone]         [facility]     [facility] Nurse Care Line -  Assistance  Unless otherwise directed by your provider, please contact [facility], our nurse care line that is available for  assistance.     Registered nurses in the [facility] provide: appointment scheduling, clinical advisement, health education, and other advisory services.  Call: [phone] (toll free)               Medications           Message regarding Medications     Verify the changes and/or additions to your medication regime listed below are the same as discussed with your clinician today.  If any of these changes or additions are incorrect, please notify your healthcare provider.        START taking these NEW medications        Refills    naproxen (NAPROSYN) 500 MG tablet 0    Sig: Take 1 tablet (500 mg total) by mouth 2 (two) times daily with meals.    Class: Normal    Route: Oral     "       Verify that the below list of medications is an accurate representation of the medications you are currently taking.  If none reported, the list may be blank. If incorrect, please contact your healthcare provider. Carry this list with you in case of emergency.           Current Medications     naproxen (NAPROSYN) 500 MG tablet Take 1 tablet (500 mg total) by mouth 2 (two) times daily with meals.           Clinical Reference Information           Your Vitals Were     BP Pulse Temp Resp Height Weight    131/85 (BP Location: Right arm, Patient Position: Sitting) 108 98.2 °F (36.8 °C) (Oral) 18 5' 7" (1.702 m) 70.3 kg (155 lb)    SpO2 BMI             100% 24.28 kg/m2         Allergies as of 4/26/2017     No Known Allergies      Immunizations Administered on Date of Encounter - 4/26/2017     None      ED Micro, Lab, POCT     None      ED Imaging Orders     Start Ordered       Status Ordering Provider    04/26/17 2121 04/26/17 2120  X-Ray Toe 2 or more views  1 time imaging      Final result       Discharge References/Attachments     TOE SPRAIN (ENGLISH)      [facility] Sign-Up     Activating your [facility] account is as easy as 1-2-3!     1) Visit [email or web address], select Sign Up Now, enter this activation code and your date of birth, then select Next.  [email or web address]  Expires: 6/10/2017  9:49 PM      2) Create a username and password to use when you visit [facility] in the future and select a security question in case you lose your password and select Next.    3) Enter your e-mail address and click Sign Up!    Additional Information  If you have questions, please e-mail [email or web address] or call [phone] to talk to our [facility] staff. Remember, [facility] is NOT to be used for urgent needs. For medical emergencies, dial 911.         Smoking Cessation     If you would like to quit smoking:   You may be eligible for free services if you are a [place] resident and started smoking cigarettes before " September 1, 1988.  Call the Smoking Cessation Trust ([employer]CT) toll free at (311) 220-3114 or (380) 527-0097.   Call 1-800-QUIT-NOW if you do not meet the above criteria.   Contact us via email: [email or web address]   View our website for more information: [email or web address]         [facility] complies with applicable Federal civil rights laws and does not discriminate on the basis of race, color, national origin, age, disability, or sex.        Language Assistance Services     ATTENTION: Language assistance services are available, free of charge. Please call [phone].      ATENCIÓN: Si habla español, tiene a pacheco disposición servicios gratuitos de asistencia lingüística. Llame al [phone].     CHÚ Ý: N?u b?n nói Ti?ng Vi?t, có các d?ch v? h? tr? ngôn ng? mi?n phí dành cho b?n. G?i s? [phone].

## 2017-04-26 NOTE — LETTER
[address] [address] [address]  Phone: [phone] April 26, 2017    Patient: Alessandra Sy   Patient ID [id number]   YOB: 1990   Date of Visit: 4/26/2017       To Whom It May Concern:    Alessandra Sy was seen and treated in our emergency department on 4/26/2017. She may return to work on 4//27/2017.    Sincerely,       Ar Vera MD

## 2017-04-28 NOTE — ED PROVIDER NOTES
Encounter Date: 4/26/2017       History     Chief Complaint   Patient presents with    Foot Pain     pt states she stubbed her toe today, it is red and slightly swollen, pt states she wasn't allowed to go to work until she got it looked at     Review of patient's allergies indicates:  No Known Allergies  HPI Comments: Patient currently presents with a chief complaint of injury to the LEFT 5th toe.  This was acquired earlier today as a result of kicking a solid object.  Patient notes associated symptoms of tenderness and swelling.  Denies associated loss in sensation or ROM.      The history is provided by the patient.     No past medical history on file.  No past surgical history on file.  No family history on file.  Social History   Substance Use Topics    Smoking status: Current Every Day Smoker     Packs/day: 0.50     Types: Cigarettes    Smokeless tobacco: Not on file    Alcohol use No     Review of Systems   Constitutional: Negative for chills and fever.   HENT: Negative for congestion and rhinorrhea.    Respiratory: Negative for cough, chest tightness, shortness of breath and wheezing.    Cardiovascular: Negative for chest pain, palpitations and leg swelling.   Gastrointestinal: Negative for abdominal pain, constipation, diarrhea, nausea and vomiting.   Genitourinary: Negative for dysuria, frequency, urgency, vaginal bleeding and vaginal discharge.   Skin: Negative for color change and rash.   Allergic/Immunologic: Negative for immunocompromised state.   Neurological: Negative for dizziness, weakness and numbness.   Hematological: Negative for adenopathy. Does not bruise/bleed easily.   All other systems reviewed and are negative.      Physical Exam   Initial Vitals   BP Pulse Resp Temp SpO2   04/26/17 2013 04/26/17 2013 04/26/17 2013 04/26/17 2013 04/26/17 2013   131/85 108 18 98.2 °F (36.8 °C) 100 %     Physical Exam    Nursing note and vitals reviewed.  Constitutional: She appears well-developed and  well-nourished. No distress.   Cardiovascular: Normal rate, regular rhythm and normal heart sounds.   Pulmonary/Chest: Breath sounds normal. No respiratory distress.   Musculoskeletal:        Left foot: There is tenderness, bony tenderness and swelling. There is normal range of motion, normal capillary refill, no crepitus, no deformity and no laceration.        Feet:    Neurological: She is alert and oriented to person, place, and time.   Skin: Skin is warm and dry.         ED Course   Procedures  Labs Reviewed - No data to display          Medical Decision Making:   Initial Assessment:   All historical, clinical, and radiographic findings were reviewed with the patient in detail.  Patient has been advised of the diagnosis of LEFT 5th toe sprain.  All remaining questions and concerns were addressed at that time.  Patient has been counseled regarding the need for follow-up as well as the indications to return to the emergency room should new or worrisome developments (including but not limited to worsening pain, cyanosis, or loss of strength or sensation) occur.  Ar Vera MD  9:01 AM                     ED Course     Clinical Impression:   The encounter diagnosis was Sprain of toe, fifth, left, initial encounter.          Ar Vera MD  04/28/17 0902

## 2017-05-11 ENCOUNTER — HOSPITAL ENCOUNTER (EMERGENCY)
Facility: HOSPITAL | Age: 27
Discharge: HOME | End: 2017-05-11
Attending: EMERGENCY MEDICINE
Payer: MEDICAID

## 2017-05-11 VITALS
OXYGEN SATURATION: 100 % | HEIGHT: 67 IN | RESPIRATION RATE: 20 BRPM | TEMPERATURE: 99 F | DIASTOLIC BLOOD PRESSURE: 66 MMHG | BODY MASS INDEX: 24.48 KG/M2 | WEIGHT: 156 LBS | SYSTOLIC BLOOD PRESSURE: 127 MMHG | HEART RATE: 96 BPM

## 2017-05-11 DIAGNOSIS — R11.2 NON-INTRACTABLE VOMITING WITH NAUSEA, UNSPECIFIED VOMITING TYPE: Primary | ICD-10-CM

## 2017-05-11 DIAGNOSIS — R05.9 COUGH: ICD-10-CM

## 2017-05-11 LAB
ALBUMIN SERPL BCP-MCNC: 3.7 G/DL
ALP SERPL-CCNC: 58 U/L
ALT SERPL W/O P-5'-P-CCNC: 14 U/L
AMYLASE SERPL-CCNC: 40 U/L
ANION GAP SERPL CALC-SCNC: 9 MMOL/L
AST SERPL-CCNC: 19 U/L
B-HCG UR QL: NEGATIVE
BASOPHILS # BLD AUTO: 0.04 K/UL
BASOPHILS NFR BLD: 0.3 %
BILIRUB SERPL-MCNC: 0.4 MG/DL
BILIRUB UR QL STRIP: NEGATIVE
BUN SERPL-MCNC: 6 MG/DL
CALCIUM SERPL-MCNC: 8.3 MG/DL
CHLORIDE SERPL-SCNC: 107 MMOL/L
CLARITY UR REFRACT.AUTO: CLEAR
CO2 SERPL-SCNC: 24 MMOL/L
COLOR UR AUTO: YELLOW
CREAT SERPL-MCNC: 0.7 MG/DL
DIFFERENTIAL METHOD: ABNORMAL
EOSINOPHIL # BLD AUTO: 0.3 K/UL
EOSINOPHIL NFR BLD: 2.6 %
ERYTHROCYTE [DISTWIDTH] IN BLOOD BY AUTOMATED COUNT: 13.5 %
EST. GFR  (AFRICAN AMERICAN): >60 ML/MIN/1.73 M^2
EST. GFR  (NON AFRICAN AMERICAN): >60 ML/MIN/1.73 M^2
GLUCOSE SERPL-MCNC: 87 MG/DL
GLUCOSE UR QL STRIP: NEGATIVE
HCT VFR BLD AUTO: 39.9 %
HGB BLD-MCNC: 13.5 G/DL
HGB UR QL STRIP: ABNORMAL
KETONES UR QL STRIP: NEGATIVE
LEUKOCYTE ESTERASE UR QL STRIP: NEGATIVE
LIPASE SERPL-CCNC: 22 U/L
LYMPHOCYTES # BLD AUTO: 2.1 K/UL
LYMPHOCYTES NFR BLD: 18.3 %
MCH RBC QN AUTO: 31 PG
MCHC RBC AUTO-ENTMCNC: 33.8 %
MCV RBC AUTO: 92 FL
MONOCYTES # BLD AUTO: 1 K/UL
MONOCYTES NFR BLD: 8.1 %
NEUTROPHILS # BLD AUTO: 8.2 K/UL
NEUTROPHILS NFR BLD: 70.4 %
NITRITE UR QL STRIP: NEGATIVE
PH UR STRIP: 7 [PH] (ref 5–8)
PLATELET # BLD AUTO: 401 K/UL
PMV BLD AUTO: 8.5 FL
POTASSIUM SERPL-SCNC: 3.6 MMOL/L
PROT SERPL-MCNC: 6.6 G/DL
PROT UR QL STRIP: NEGATIVE
RBC # BLD AUTO: 4.35 M/UL
SODIUM SERPL-SCNC: 140 MMOL/L
SP GR UR STRIP: <=1.005 (ref 1–1.03)
URN SPEC COLLECT METH UR: ABNORMAL
UROBILINOGEN UR STRIP-ACNC: <2 EU/DL
WBC # BLD AUTO: 11.67 K/UL

## 2017-05-11 PROCEDURE — 83690 ASSAY OF LIPASE: CPT

## 2017-05-11 PROCEDURE — 81003 URINALYSIS AUTO W/O SCOPE: CPT

## 2017-05-11 PROCEDURE — 82150 ASSAY OF AMYLASE: CPT

## 2017-05-11 PROCEDURE — 99284 EMERGENCY DEPT VISIT MOD MDM: CPT | Mod: 25

## 2017-05-11 PROCEDURE — 96374 THER/PROPH/DIAG INJ IV PUSH: CPT

## 2017-05-11 PROCEDURE — 63600175 PHARM REV CODE 636 W HCPCS: Performed by: EMERGENCY MEDICINE

## 2017-05-11 PROCEDURE — 96361 HYDRATE IV INFUSION ADD-ON: CPT

## 2017-05-11 PROCEDURE — 81025 URINE PREGNANCY TEST: CPT

## 2017-05-11 PROCEDURE — 85025 COMPLETE CBC W/AUTO DIFF WBC: CPT

## 2017-05-11 PROCEDURE — 25000003 PHARM REV CODE 250: Performed by: EMERGENCY MEDICINE

## 2017-05-11 PROCEDURE — 80053 COMPREHEN METABOLIC PANEL: CPT

## 2017-05-11 RX ORDER — ONDANSETRON 2 MG/ML
4 INJECTION INTRAMUSCULAR; INTRAVENOUS
Status: COMPLETED | OUTPATIENT
Start: 2017-05-11 | End: 2017-05-11

## 2017-05-11 RX ORDER — PROMETHAZINE HYDROCHLORIDE AND DEXTROMETHORPHAN HYDROBROMIDE 6.25; 15 MG/5ML; MG/5ML
5 SYRUP ORAL EVERY 6 HOURS PRN
Qty: 120 ML | Refills: 0 | Status: SHIPPED | OUTPATIENT
Start: 2017-05-11 | End: 2017-05-21

## 2017-05-11 RX ADMIN — SODIUM CHLORIDE 1000 ML: 0.9 INJECTION, SOLUTION INTRAVENOUS at 01:05

## 2017-05-11 RX ADMIN — ONDANSETRON 4 MG: 2 INJECTION INTRAMUSCULAR; INTRAVENOUS at 01:05

## 2017-05-11 NOTE — ED AVS SNAPSHOT
[facility]  [address] [address] [address]               Alessandra Sy   2017 12:44 PM   ED    Description:  Female : 1990   Department:  [facility]           Your Care was Coordinated By:     Provider Role From To    Spike Gonzales MD Attending Provider 17 1216 --      Reason for Visit     Vomiting     Cough     Abdominal Pain           Diagnoses this Visit        Comments    Non-intractable vomiting with nausea, unspecified vomiting type    -  Primary     Cough           ED Disposition     ED Disposition Condition Comment    Discharge             To Do List           Follow-up Information     Follow up with PCP In 2 days.    Contact information:    [phone]       These Medications        Disp Refills Start End    promethazine-dextromethorphan (PROMETHAZINE-DM) 6.25-15 mg/5 mL Syrp 120 mL 0 2017    Take 5 mLs by mouth every 6 (six) hours as needed. - Oral    Pharmacy: [facility] [address] [place] [address] [place] [address] AT [facility] Ph #: [phone]         [facility]     [facility] Nurse Care [facility] - 24 Assistance  Unless otherwise directed by your provider, please contact [facility], our nurse care line that is available for  assistance.     Registered nurses in the [facility] provide: appointment scheduling, clinical advisement, health education, and other advisory services.  Call: [phone] (toll free)               Medications           Message regarding Medications     Verify the changes and/or additions to your medication regime listed below are the same as discussed with your clinician today.  If any of these changes or additions are incorrect, please notify your healthcare provider.        START taking these NEW medications        Refills    promethazine-dextromethorphan (PROMETHAZINE-DM) 6.25-15 mg/5 mL Syrp 0    Sig: Take 5 mLs by  "mouth every 6 (six) hours as needed.    Class: Print    Route: Oral      These medications were administered today        Dose Freq    ondansetron injection 4 mg 4 mg ED 1 Time    Sig: Inject 4 mg into the vein ED 1 Time.    Class: Normal    Route: Intravenous    sodium chloride 0.9% bolus 1,000 mL 1,000 mL ED 1 Time    Sig: Inject 1,000 mLs into the vein ED 1 Time.    Class: Normal    Route: Intravenous      STOP taking these medications     naproxen (NAPROSYN) 500 MG tablet Take 1 tablet (500 mg total) by mouth 2 (two) times daily with meals.           Verify that the below list of medications is an accurate representation of the medications you are currently taking.  If none reported, the list may be blank. If incorrect, please contact your healthcare provider. Carry this list with you in case of emergency.           Current Medications     promethazine-dextromethorphan (PROMETHAZINE-DM) 6.25-15 mg/5 mL Syrp Take 5 mLs by mouth every 6 (six) hours as needed.           Clinical Reference Information           Your Vitals Were     BP Pulse Temp Resp Height Weight    127/66 (BP Location: Left arm, Patient Position: Sitting) 96 98.6 °F (37 °C) (Oral) 20 5' 7" (1.702 m) 70.8 kg (156 lb)    Last Period SpO2 BMI          05/11/2017 100% 24.43 kg/m2        Allergies as of 5/11/2017     No Known Allergies      Immunizations Administered on Date of Encounter - 5/11/2017     None      ED Micro, Lab, POCT     Start Ordered       Status Ordering Provider    05/11/17 1249 05/11/17 1248  CBC auto differential  STAT      Final result     05/11/17 1249 05/11/17 1248  Comprehensive metabolic panel  STAT      Final result     05/11/17 1249 05/11/17 1248  Urinalysis  STAT      Final result     05/11/17 1249 05/11/17 1248  Lipase  STAT      Final result     05/11/17 1249 05/11/17 1248  Pregnancy, urine rapid  STAT      Final result     05/11/17 1249 05/11/17 1248  Amylase  STAT      Final result       ED Imaging Orders     Start " Ordered       Status Ordering Provider    05/11/17 1249 05/11/17 1248  X-Ray Chest PA And Lateral  1 time imaging      Final result       Discharge References/Attachments     VOMITING OR DIARRHEA (ADULT), DIET FOR (ENGLISH)      [facility] Sign-Up     Activating your [facility] account is as easy as 1-2-3!     1) Visit [email or web address], select Sign Up Now, enter this activation code and your date of birth, then select Next.  [email or web address]  Expires: 6/10/2017  9:49 PM      2) Create a username and password to use when you visit [facility] in the future and select a security question in case you lose your password and select Next.    3) Enter your e-mail address and click Sign Up!    Additional Information  If you have questions, please e-mail [email or web address] or call [phone] to talk to our [facility] staff. Remember, [facility] is NOT to be used for urgent needs. For medical emergencies, dial 911.         Smoking Cessation     If you would like to quit smoking:   You may be eligible for free services if you are a [place] resident and started smoking cigarettes before September 1, 1988.  Call the Smoking Cessation Trust ([facility]) toll free at (170) 328-4933 or (357) 161-4887.   Call 8-472-QUIT-NOW if you do not meet the above criteria.   Contact us via email: [email or web address]   View our website for more information: [email or web address]         [facility] complies with applicable Federal civil rights laws and does not discriminate on the basis of race, color, national origin, age, disability, or sex.        Language Assistance Services     ATTENTION: Language assistance services are available, free of charge. Please call [phone].      ATENCIÓN: Si habla español, tiene a pacheco disposición servicios gratuitos de asistencia lingüística. Llame al [phone].     CHÚ Ý: N?u b?n nói Ti?ng Vi?t, có các d?ch v? h? tr? ngôn ng? mi?n phí dành cho b?n. G?i s? [phone].

## 2017-07-24 ENCOUNTER — HOSPITAL ENCOUNTER (EMERGENCY)
Facility: HOSPITAL | Age: 27
Discharge: HOME | End: 2017-07-24
Attending: EMERGENCY MEDICINE
Payer: MEDICAID

## 2017-07-24 VITALS
SYSTOLIC BLOOD PRESSURE: 131 MMHG | DIASTOLIC BLOOD PRESSURE: 65 MMHG | BODY MASS INDEX: 22.6 KG/M2 | RESPIRATION RATE: 16 BRPM | WEIGHT: 144 LBS | TEMPERATURE: 97 F | OXYGEN SATURATION: 100 % | HEIGHT: 67 IN | HEART RATE: 80 BPM

## 2017-07-24 DIAGNOSIS — D72.829 LEUKOCYTOSIS, UNSPECIFIED TYPE: ICD-10-CM

## 2017-07-24 DIAGNOSIS — Z72.0 TOBACCO ABUSE: ICD-10-CM

## 2017-07-24 DIAGNOSIS — R10.13 EPIGASTRIC PAIN: Primary | ICD-10-CM

## 2017-07-24 DIAGNOSIS — R03.0 ELEVATED BLOOD PRESSURE READING: ICD-10-CM

## 2017-07-24 DIAGNOSIS — R11.10 VOMITING, INTRACTABILITY OF VOMITING NOT SPECIFIED, PRESENCE OF NAUSEA NOT SPECIFIED, UNSPECIFIED VOMITING TYPE: ICD-10-CM

## 2017-07-24 LAB
ALBUMIN SERPL BCP-MCNC: 4.1 G/DL
ALP SERPL-CCNC: 67 U/L
ALT SERPL W/O P-5'-P-CCNC: 17 U/L
AMYLASE SERPL-CCNC: 43 U/L
ANION GAP SERPL CALC-SCNC: 11 MMOL/L
AST SERPL-CCNC: 23 U/L
B-HCG UR QL: NEGATIVE
BASOPHILS # BLD AUTO: 0.03 K/UL
BASOPHILS NFR BLD: 0.2 %
BILIRUB SERPL-MCNC: 0.8 MG/DL
BILIRUB UR QL STRIP: NEGATIVE
BUN SERPL-MCNC: 6 MG/DL
CALCIUM SERPL-MCNC: 9 MG/DL
CHLORIDE SERPL-SCNC: 103 MMOL/L
CLARITY UR REFRACT.AUTO: CLEAR
CO2 SERPL-SCNC: 22 MMOL/L
COLOR UR AUTO: YELLOW
CREAT SERPL-MCNC: 0.8 MG/DL
DIFFERENTIAL METHOD: ABNORMAL
EOSINOPHIL # BLD AUTO: 0.2 K/UL
EOSINOPHIL NFR BLD: 1.5 %
ERYTHROCYTE [DISTWIDTH] IN BLOOD BY AUTOMATED COUNT: 13.8 %
EST. GFR  (AFRICAN AMERICAN): >60 ML/MIN/1.73 M^2
EST. GFR  (NON AFRICAN AMERICAN): >60 ML/MIN/1.73 M^2
GLUCOSE SERPL-MCNC: 84 MG/DL
GLUCOSE UR QL STRIP: NEGATIVE
HCT VFR BLD AUTO: 42.3 %
HGB BLD-MCNC: 14.2 G/DL
HGB UR QL STRIP: ABNORMAL
KETONES UR QL STRIP: ABNORMAL
LEUKOCYTE ESTERASE UR QL STRIP: NEGATIVE
LIPASE SERPL-CCNC: 16 U/L
LYMPHOCYTES # BLD AUTO: 2.3 K/UL
LYMPHOCYTES NFR BLD: 16.3 %
MCH RBC QN AUTO: 30.9 PG
MCHC RBC AUTO-ENTMCNC: 33.6 G/DL
MCV RBC AUTO: 92 FL
MONOCYTES # BLD AUTO: 1 K/UL
MONOCYTES NFR BLD: 7.3 %
NEUTROPHILS # BLD AUTO: 10.7 K/UL
NEUTROPHILS NFR BLD: 74.4 %
NITRITE UR QL STRIP: NEGATIVE
PH UR STRIP: 6 [PH] (ref 5–8)
PLATELET # BLD AUTO: 427 K/UL
PMV BLD AUTO: 8.6 FL
POTASSIUM SERPL-SCNC: 4.1 MMOL/L
PROT SERPL-MCNC: 7.6 G/DL
PROT UR QL STRIP: NEGATIVE
RBC # BLD AUTO: 4.59 M/UL
SODIUM SERPL-SCNC: 136 MMOL/L
SP GR UR STRIP: >=1.03 (ref 1–1.03)
URN SPEC COLLECT METH UR: ABNORMAL
UROBILINOGEN UR STRIP-ACNC: NEGATIVE EU/DL
WBC # BLD AUTO: 14.32 K/UL

## 2017-07-24 PROCEDURE — 82150 ASSAY OF AMYLASE: CPT

## 2017-07-24 PROCEDURE — 80053 COMPREHEN METABOLIC PANEL: CPT

## 2017-07-24 PROCEDURE — 81003 URINALYSIS AUTO W/O SCOPE: CPT

## 2017-07-24 PROCEDURE — 25000003 PHARM REV CODE 250: Performed by: EMERGENCY MEDICINE

## 2017-07-24 PROCEDURE — 96375 TX/PRO/DX INJ NEW DRUG ADDON: CPT

## 2017-07-24 PROCEDURE — 85025 COMPLETE CBC W/AUTO DIFF WBC: CPT

## 2017-07-24 PROCEDURE — 93010 ELECTROCARDIOGRAM REPORT: CPT | Mod: ,,, | Performed by: NUCLEAR MEDICINE

## 2017-07-24 PROCEDURE — 99900035 HC TECH TIME PER 15 MIN (STAT): Performed by: GENERAL PRACTICE

## 2017-07-24 PROCEDURE — 93005 ELECTROCARDIOGRAM TRACING: CPT | Performed by: GENERAL PRACTICE

## 2017-07-24 PROCEDURE — 63600175 PHARM REV CODE 636 W HCPCS: Performed by: EMERGENCY MEDICINE

## 2017-07-24 PROCEDURE — 99284 EMERGENCY DEPT VISIT MOD MDM: CPT | Mod: 25

## 2017-07-24 PROCEDURE — 81025 URINE PREGNANCY TEST: CPT

## 2017-07-24 PROCEDURE — C9113 INJ PANTOPRAZOLE SODIUM, VIA: HCPCS | Performed by: EMERGENCY MEDICINE

## 2017-07-24 PROCEDURE — 83690 ASSAY OF LIPASE: CPT

## 2017-07-24 PROCEDURE — 96374 THER/PROPH/DIAG INJ IV PUSH: CPT

## 2017-07-24 RX ORDER — ONDANSETRON 2 MG/ML
4 INJECTION INTRAMUSCULAR; INTRAVENOUS ONCE
Status: COMPLETED | OUTPATIENT
Start: 2017-07-24 | End: 2017-07-24

## 2017-07-24 RX ORDER — OMEPRAZOLE 20 MG/1
40 CAPSULE, DELAYED RELEASE ORAL DAILY
Qty: 60 CAPSULE | Refills: 1 | Status: SHIPPED | OUTPATIENT
Start: 2017-07-24 | End: 2017-11-09 | Stop reason: ALTCHOICE

## 2017-07-24 RX ORDER — SUCRALFATE 1 G/1
1 TABLET ORAL
Qty: 28 TABLET | Refills: 0 | Status: SHIPPED | OUTPATIENT
Start: 2017-07-24 | End: 2017-12-09

## 2017-07-24 RX ORDER — ONDANSETRON 4 MG/1
4 TABLET, ORALLY DISINTEGRATING ORAL EVERY 8 HOURS PRN
Qty: 12 TABLET | Refills: 0 | Status: SHIPPED | OUTPATIENT
Start: 2017-07-24 | End: 2017-12-09

## 2017-07-24 RX ORDER — PANTOPRAZOLE SODIUM 20 MG/1
20 TABLET, DELAYED RELEASE ORAL DAILY
COMMUNITY
End: 2017-07-24

## 2017-07-24 RX ORDER — PANTOPRAZOLE SODIUM 40 MG/10ML
40 INJECTION, POWDER, LYOPHILIZED, FOR SOLUTION INTRAVENOUS
Status: COMPLETED | OUTPATIENT
Start: 2017-07-24 | End: 2017-07-24

## 2017-07-24 RX ADMIN — SODIUM CHLORIDE 1000 ML: 0.9 INJECTION, SOLUTION INTRAVENOUS at 06:07

## 2017-07-24 RX ADMIN — PANTOPRAZOLE SODIUM 40 MG: 40 INJECTION, POWDER, FOR SOLUTION INTRAVENOUS at 05:07

## 2017-07-24 RX ADMIN — ONDANSETRON 4 MG: 2 INJECTION INTRAMUSCULAR; INTRAVENOUS at 05:07

## 2017-07-24 RX ADMIN — LIDOCAINE HYDROCHLORIDE 50 ML: 20 SOLUTION ORAL; TOPICAL at 05:07

## 2017-07-24 NOTE — ED PROVIDER NOTES
"Encounter Date: 7/24/2017       History     Chief Complaint   Patient presents with    Abdominal Pain     Pt states, "I can't hold anything down and my stomach hurts really really bad." Onset yesterday. Reports sig other had same symptoms yesterday.     Emesis     The patient is a 26 y.o. male presenting to the emergency room complaining of abdominal pain.  The pain is located in the center of the abdomen epigastric/periumbilical region.  It is described as sharp.  Rated 9 out of 10.  Started yesterday with nausea vomiting.  Significant other gave her 6 tablets of 200 mg of ibuprofen this morning to help with the pain.  And has had waxing and waning epigastric pain.  This is the first time that she's vomited with it.  Patient was seen at [facility] on July 7,2017 and had workup.  (See MMR)  Patient vomited it up.  She last took ibuprofen approximately 6 months ago.  Does smoke tobacco.  Patient denies any diarrhea, melena, hematochezia, hematemesis, dysuria, urgency, frequency.  Patient was treated with Prilosec.           Review of patient's allergies indicates:  No Known Allergies  Past Medical History:   Diagnosis Date    Gastric ulcer      History reviewed. No pertinent surgical history.  History reviewed. No pertinent family history.  Social History   Substance Use Topics    Smoking status: Current Every Day Smoker     Packs/day: 0.50     Types: Cigarettes    Smokeless tobacco: Never Used    Alcohol use No     Review of Systems   Constitutional: Positive for chills. Negative for fever.   HENT: Negative for sore throat.    Respiratory: Positive for cough (About 10 days ago, green sputum). Negative for shortness of breath.    Cardiovascular: Negative for chest pain.   Gastrointestinal: Positive for abdominal pain and vomiting. Negative for blood in stool, diarrhea and nausea.   Genitourinary: Negative for dysuria, frequency, hematuria and urgency.   Musculoskeletal: Negative for back pain.   Skin: " "Negative for rash.   Neurological: Negative for weakness.   Hematological: Does not bruise/bleed easily.       Physical Exam     Initial Vitals [07/24/17 1636]   BP Pulse Resp Temp SpO2   131/65 80 16 97.4 °F (36.3 °C) 100 %      MAP       87           Vitals:    07/24/17 1636   BP: 131/65   Pulse: 80   Resp: 16   Temp: 97.4 °F (36.3 °C)   TempSrc: Oral   SpO2: 100%   Weight: 65.3 kg (144 lb)   Height: 5' 7" (1.702 m)       Physical Exam    Constitutional: She appears well-developed and well-nourished.   HENT:   Head: Normocephalic and atraumatic.   Eyes: Conjunctivae and EOM are normal. Pupils are equal, round, and reactive to light.   Neck: Normal range of motion.   Cardiovascular: Normal rate, regular rhythm and normal heart sounds.   Pulmonary/Chest: Breath sounds normal. No respiratory distress.   Abdominal: Soft. She exhibits no mass. There is tenderness. There is no rebound and no guarding.   (epigastric region.)   Musculoskeletal: Normal range of motion.   Neurological: She is alert and oriented to person, place, and time. She has normal strength. No cranial nerve deficit.   Cranial nerves II-XII intact   Skin: Skin is warm and dry. Capillary refill takes less than 2 seconds.   Psychiatric: She has a normal mood and affect. Her speech is normal and behavior is normal. Thought content normal.         ED Course   Procedures  Labs Reviewed   CBC W/ AUTO DIFFERENTIAL - Abnormal; Notable for the following:        Result Value    WBC 14.32 (*)     Platelets 427 (*)     MPV 8.6 (*)     Gran # 10.7 (*)     Gran% 74.4 (*)     Lymph% 16.3 (*)     All other components within normal limits   COMPREHENSIVE METABOLIC PANEL - Abnormal; Notable for the following:     CO2 22 (*)     All other components within normal limits   URINALYSIS - Abnormal; Notable for the following:     Specific Gravity, UA >=1.030 (*)     Ketones, UA Trace (*)     Occult Blood UA Trace (*)     All other components within normal limits   LIPASE "   AMYLASE   PREGNANCY TEST, URINE RAPID     EKG Readings: (Independently Interpreted)   Initial Reading: No STEMI. Clinical Impression: Normal Sinus Rhythm   EKG: Rate 62 bpm.  Sinus rhythm.  Normal axis.  No acute ST or T-wave changes.     Results for orders placed or performed during the hospital encounter of 07/24/17   CBC auto differential   Result Value Ref Range    WBC 14.32 (H) 3.90 - 12.70 K/uL    RBC 4.59 4.00 - 5.40 M/uL    Hemoglobin 14.2 12.0 - 16.0 g/dL    Hematocrit 42.3 37.0 - 48.5 %    MCV 92 82 - 98 fL    MCH 30.9 27.0 - 31.0 pg    MCHC 33.6 32.0 - 36.0 g/dL    RDW 13.8 11.5 - 14.5 %    Platelets 427 (H) 150 - 350 K/uL    MPV 8.6 (L) 9.2 - 12.9 fL    Gran # 10.7 (H) 1.8 - 7.7 K/uL    Lymph # 2.3 1.0 - 4.8 K/uL    Mono # 1.0 0.3 - 1.0 K/uL    Eos # 0.2 0.0 - 0.5 K/uL    Baso # 0.03 0.00 - 0.20 K/uL    Gran% 74.4 (H) 38.0 - 73.0 %    Lymph% 16.3 (L) 18.0 - 48.0 %    Mono% 7.3 4.0 - 15.0 %    Eosinophil% 1.5 0.0 - 8.0 %    Basophil% 0.2 0.0 - 1.9 %    Differential Method Automated    Comprehensive metabolic panel   Result Value Ref Range    Sodium 136 136 - 145 mmol/L    Potassium 4.1 3.5 - 5.1 mmol/L    Chloride 103 95 - 110 mmol/L    CO2 22 (L) 23 - 29 mmol/L    Glucose 84 70 - 110 mg/dL    BUN, Bld 6 6 - 20 mg/dL    Creatinine 0.8 0.5 - 1.4 mg/dL    Calcium 9.0 8.7 - 10.5 mg/dL    Total Protein 7.6 6.0 - 8.4 g/dL    Albumin 4.1 3.5 - 5.2 g/dL    Total Bilirubin 0.8 0.1 - 1.0 mg/dL    Alkaline Phosphatase 67 55 - 135 U/L    AST 23 10 - 40 U/L    ALT 17 10 - 44 U/L    Anion Gap 11 8 - 16 mmol/L    eGFR if African American >60.0 >60 mL/min/1.73 m^2    eGFR if non African American >60.0 >60 mL/min/1.73 m^2   Lipase   Result Value Ref Range    Lipase 16 4 - 60 U/L   Amylase   Result Value Ref Range    Amylase 43 20 - 110 U/L   Urinalysis   Result Value Ref Range    Specimen UA Urine, Clean Catch     Color, UA Yellow Yellow, Straw, Jocelynn    Appearance, UA Clear Clear    pH, UA 6.0 5.0 - 8.0    Specific  Gravity, UA >=1.030 (A) 1.005 - 1.030    Protein, UA Negative Negative    Glucose, UA Negative Negative    Ketones, UA Trace (A) Negative    Bilirubin (UA) Negative Negative    Occult Blood UA Trace (A) Negative    Nitrite, UA Negative Negative    Urobilinogen, UA Negative <2.0 EU/dL    Leukocytes, UA Negative Negative   Pregnancy, urine rapid   Result Value Ref Range    Preg Test, Ur Negative        Imaging Results          X-Ray Abdomen Flat And Erect (Final result)  Result time 07/24/17 18:05:42    Final result by Antwon Torrez MD (07/24/17 18:05:42)                 Impression:           1. Negative for acute process involving the abdomen or pelvis.  2.  Incidental findings as noted above.      Electronically signed by: ANTWON TORREZ MD  Date:     07/24/17  Time:    18:05              Narrative:    Abdomen flat and erect, 2 views:    Clinical Indication: Unspecified abdominal pain.      Findings:    No comparison studies are available.   The abdominal gas pattern is normal. No sign of bowel dilation, air/fluid levels or free air.       No abnormal calcifications. Convex right curvature lower thoracic spine.  Convex left curvature of the lumbar spine.    An IUD is in place.                             X-Ray Chest 1 View (Final result)  Result time 07/24/17 18:05:04    Final result by Antwon Torrez MD (07/24/17 18:05:04)                 Impression:          1.  Negative for acute process involving the chest.  2.  Stable findings as noted above.      Electronically signed by: ANTWON TORREZ MD  Date:     07/24/17  Time:    18:05              Narrative:    Portable Chest x-ray    Clinical Indication: Epigastric abdominal pain.  Chest pain.       Findings:     Comparisons are made to 05/11/2017.     The lungs are clear. The cardiac silhouette size is normal. The trachea is midline and the mediastinal width is normal. Negative for focal infiltrate, effusion or pneumothorax. Pulmonary vasculature is normal. Negative  for osseous abnormalities.   Mild S-shaped curvature of the spine. There are fat pads adjacent to one or both cardiophrenic angles.                                   Medical Decision Making:   History:   Old Records Summarized: records from another hospital.       <> Summary of Records: Interface, Rad Results In - 07/07/2017 5:59 PM CDT    ULTRASOUND ABDOMEN LIMITED    CLINICAL INDICATION: Pain    FINDINGS:   Liver measures 18 cm and is normal in echogenicity .    There is no intrahepatic biliary ductal dilatation or mass.    There is hepatopedal flow in the main portal vein. The IVC is patent.     The gallbladder is normal.    Sonographic Smith's sign is negative.   Common bile duct measures 3-4 mm.    The right kidney is 10 cm in length and is of normal echogenicity without hydronephrosis.      IMPRESSION:    1.Normal right upper quadrant ultrasound        Interface, Rad Results In - 07/07/2017 8:02 PM CDT    EXAMINATION: CT of the abdomen and pelvis with IV contrast.    CLINICAL HISTORY: Abdominal pain, .    FINDINGS:    The liver is normal in attenuation. With no mass or intrahepatic duct dilatation.  kidneys are normal.  Adrenal glands are normal.  Gallbladder is normal.  Pancreas is normal ,  Spleen is normal.  Aorta is normal.    There is no evidence of appendicitis.    IUD is in place.      IMPRESSION:  1.Negative Ct of the abdomen and pelvis.       Automatic exposure control was used for radiation dose reduction                                     ED Course     Medications   GI cocktail (mylanta 30 mL, lidocaine 2 % viscous 10 mL, dicyclomine 10 mL) 50 mL (50 mLs Oral Given 7/24/17 1729)   pantoprazole injection 40 mg (40 mg Intravenous Given 7/24/17 1728)   ondansetron injection 4 mg (4 mg Intravenous Given 7/24/17 1725)   sodium chloride 0.9% bolus 1,000 mL (1,000 mLs Intravenous New Bag 7/24/17 1817)       6:38 PM Reassessment: Dr. Uriostegui reassessed the pt. .  Abdominal exam demonstrates soft, no  rebound or guarding no masses noted.  Discussed importance of quitting smoking.  The pt is resting comfortably and is NAD.  Pt states their sx have improved. Discussed test results, shared treatment plan, specific conditions for return, and the need for f/u.  Answered their questions at this time.  Pt understands and agrees to the plan.  The pt has remained hemodynamically stable through ED course and is stable for discharge.     Follow-up Information     Primary Care Provider of Choice. Schedule an appointment as soon as possible for a visit in 2 days.    Why:  Stop smoking.  Avoid Motrin/naprosyn/aspirin/aleve, red sauces, caffiene, peppermint, milk products.  Contact information:  [phone] for appointment           Please follow up.    Contact information:  Return to the ED for:   blood in stool, blood in vomit, black tarry stools, weakness, fever, passing out or other concerns.                   New Prescriptions    OMEPRAZOLE (PRILOSEC) 20 MG CAPSULE    Take 2 capsules (40 mg total) by mouth once daily.    ONDANSETRON (ZOFRAN-ODT) 4 MG TBDL    Take 1 tablet (4 mg total) by mouth every 8 (eight) hours as needed.    SUCRALFATE (CARAFATE) 1 GRAM TABLET    Take 1 tablet (1 g total) by mouth 4 (four) times daily before meals and nightly.        Discontinued Medications    PANTOPRAZOLE (PROTONIX) 20 MG TABLET    Take 20 mg by mouth once daily.         Pre-hypertension/Hypertension: The pt has been informed that they may have pre-hypertension or hypertension based on a blood pressure reading in the ED. I recommend that the pt call the PCP listed on their discharge instructions or a physician of their choice this week to arrange f/u for further evaluation of possible pre-hypertension or hypertension.       I discussed with patient and/or family/caretaker that evaluation in the ED does not suggest any emergent or life threatening medical conditions requiring immediate intervention beyond what was provided in the ED,  and I believe patient is safe for discharge.  Regardless, an unremarkable evaluation in the ED does not preclude the development or presence of a serious of life threatening condition. As such, patient was instructed to return immediately for any worsening or change in current symptoms.      Clinical Impression:       Disposition:   Disposition: Discharged  Condition: Stable       ICD-10-CM ICD-9-CM   1. Epigastric pain R10.13 789.06   2. Vomiting, intractability of vomiting not specified, presence of nausea not specified, unspecified vomiting type R11.10 787.03   3. Leukocytosis, unspecified type D72.829 288.60   4. Elevated blood pressure reading R03.0 796.2   5. Tobacco abuse Z72.0 305.1                           Sylvia Uriostegui, DO  07/24/17 1840       Sylvia Uriostegui, DO  07/24/17 1841

## 2017-11-09 ENCOUNTER — HOSPITAL ENCOUNTER (EMERGENCY)
Facility: HOSPITAL | Age: 27
Discharge: HOME | End: 2017-11-09
Payer: MEDICAID

## 2017-11-09 VITALS
HEART RATE: 79 BPM | HEIGHT: 67 IN | SYSTOLIC BLOOD PRESSURE: 116 MMHG | OXYGEN SATURATION: 100 % | WEIGHT: 152.63 LBS | RESPIRATION RATE: 18 BRPM | BODY MASS INDEX: 23.96 KG/M2 | DIASTOLIC BLOOD PRESSURE: 77 MMHG | TEMPERATURE: 98 F

## 2017-11-09 DIAGNOSIS — J06.9 UPPER RESPIRATORY TRACT INFECTION, UNSPECIFIED TYPE: ICD-10-CM

## 2017-11-09 DIAGNOSIS — K21.9 GASTROESOPHAGEAL REFLUX DISEASE, ESOPHAGITIS PRESENCE NOT SPECIFIED: Primary | ICD-10-CM

## 2017-11-09 PROCEDURE — 99283 EMERGENCY DEPT VISIT LOW MDM: CPT

## 2017-11-09 PROCEDURE — 25000003 PHARM REV CODE 250: Performed by: REGISTERED NURSE

## 2017-11-09 RX ORDER — ONDANSETRON 4 MG/1
4 TABLET, ORALLY DISINTEGRATING ORAL
Status: COMPLETED | OUTPATIENT
Start: 2017-11-09 | End: 2017-11-09

## 2017-11-09 RX ORDER — OMEPRAZOLE 20 MG/1
20 CAPSULE, DELAYED RELEASE ORAL DAILY
Qty: 30 CAPSULE | Refills: 0 | Status: SHIPPED | OUTPATIENT
Start: 2017-11-09 | End: 2017-12-09

## 2017-11-09 RX ADMIN — ONDANSETRON 4 MG: 4 TABLET, ORALLY DISINTEGRATING ORAL at 02:11

## 2017-11-09 RX ADMIN — LIDOCAINE HYDROCHLORIDE 50 ML: 20 SOLUTION ORAL; TOPICAL at 02:11

## 2017-11-09 NOTE — ED PROVIDER NOTES
Encounter Date: 11/9/2017       History     Chief Complaint   Patient presents with    URI     sore throat, cough and runny nose x 3 days    Emesis     and diarrhea, since yesterday, last episode last night, last meal last night, tolerates fluid, sharp burning discomfort in stomach, with dark colored emesis     Pt presents to ED complaining of sore throat and abdominal pain which onset gradually a few days ago. Pt reports burning in her stomach after meals and states that she ate fried fish a few nights ago when the symptoms started. She also states that other people in the house have had upper respiratory symptoms and she has began with coughing, sneezing and sore throat that started last night. Symptoms are constant and mild in severity. Pain is worse after meals and when lying down. Patient denies any fever, CP or SOB and all other sxs at this time. No prior treatment. No further complaints or concerns at this time.             Review of patient's allergies indicates:  No Known Allergies  Past Medical History:   Diagnosis Date    Gastric ulcer      History reviewed. No pertinent surgical history.  History reviewed. No pertinent family history.  Social History   Substance Use Topics    Smoking status: Current Every Day Smoker     Packs/day: 0.50     Types: Cigarettes    Smokeless tobacco: Never Used    Alcohol use No     Review of Systems   Constitutional: Negative for fever.   HENT: Positive for sneezing and sore throat.    Respiratory: Positive for cough. Negative for shortness of breath.    Cardiovascular: Negative for chest pain.   Gastrointestinal: Positive for abdominal pain. Negative for nausea.   Genitourinary: Negative for dysuria.   Musculoskeletal: Negative for back pain.   Skin: Negative for rash.   Neurological: Negative for weakness.   Hematological: Does not bruise/bleed easily.   All other systems reviewed and are negative.      Physical Exam     Initial Vitals [11/09/17 1332]   BP Pulse Resp  Temp SpO2   130/77 81 20 98.3 °F (36.8 °C) 100 %      MAP       94.67         Physical Exam  Vital signs and nursing notes reviewed.  Constitutional: Patient is in no acute distress. Awake and alert. Well-developed and well-nourished.  Head: Atraumatic. Normocephalic.  Eyes: PERRL. EOM intact. Conjunctivae nl. No scleral icterus.  ENT: Mucous membranes are moist. Oropharynx is clear.  Neck: Supple. Full ROM. No lymphadenopathy.  Cardiovascular: Regular rate and rhythm. No murmurs, rubs, or gallops. Distal pulses are 2+ and symmetric .  Pulmonary/Chest: No respiratory distress. Clear to auscultation bilaterally. No wheezing, rales, or rhonchi.  Abdominal: Soft. Non-distended. No TTP. No rebound, guarding, or rigidity. Good bowel sounds.  Genitourinary: No CVA tenderness  Musculoskeletal: Moves all extremities. No edema. No calf tenderness.  Skin: Warm and dry.  Neurological: Awake and alert. No acute focal neurological deficits are appreciated.  Psychiatric: Normal affect. Good eye contact. Appropriate in content.    ED Course   Procedures  Labs Reviewed - No data to display                            ED Course    2:56 PM: Reassessed pt at this time.  Pt states her condition has improved at this time. Discussed with pt all pertinent ED information and results. Discussed pt dx of acid reflux and plan of tx. Gave pt all f/u and return to the ED instructions. All questions and concerns were addressed at this time. Pt expresses understanding of information and instructions, and is comfortable with plan to discharge. Pt is stable for discharge.    Discharge Medication List as of 11/9/2017  2:47 PM      START taking these medications    Details   omeprazole (PRILOSEC) 20 MG capsule Take 1 capsule (20 mg total) by mouth once daily., Starting Thu 11/9/2017, Until Fri 11/9/2018, Print             Clinical Impression:   The primary encounter diagnosis was Gastroesophageal reflux disease, esophagitis presence not specified. A  diagnosis of Upper respiratory tract infection, unspecified type was also pertinent to this visit.                           Fernando John Jr., [facility]  11/09/17 1486

## 2017-12-09 ENCOUNTER — HOSPITAL ENCOUNTER (EMERGENCY)
Facility: HOSPITAL | Age: 27
Discharge: HOME | End: 2017-12-09
Attending: INTERNAL MEDICINE
Payer: MEDICAID

## 2017-12-09 VITALS
RESPIRATION RATE: 18 BRPM | BODY MASS INDEX: 24.45 KG/M2 | OXYGEN SATURATION: 100 % | SYSTOLIC BLOOD PRESSURE: 143 MMHG | TEMPERATURE: 98 F | WEIGHT: 155.81 LBS | HEART RATE: 93 BPM | DIASTOLIC BLOOD PRESSURE: 78 MMHG | HEIGHT: 67 IN

## 2017-12-09 DIAGNOSIS — J00 ACUTE NASOPHARYNGITIS: ICD-10-CM

## 2017-12-09 DIAGNOSIS — R03.0 ELEVATED BLOOD PRESSURE READING WITHOUT DIAGNOSIS OF HYPERTENSION: ICD-10-CM

## 2017-12-09 DIAGNOSIS — B96.89 ACUTE BACTERIAL RHINOSINUSITIS: Primary | ICD-10-CM

## 2017-12-09 DIAGNOSIS — J01.90 ACUTE BACTERIAL RHINOSINUSITIS: Primary | ICD-10-CM

## 2017-12-09 PROCEDURE — 99283 EMERGENCY DEPT VISIT LOW MDM: CPT

## 2017-12-09 RX ORDER — AMOXICILLIN AND CLAVULANATE POTASSIUM 875; 125 MG/1; MG/1
1 TABLET, FILM COATED ORAL 2 TIMES DAILY
Qty: 20 TABLET | Refills: 0 | Status: SHIPPED | OUTPATIENT
Start: 2017-12-09 | End: 2017-12-19

## 2017-12-09 RX ORDER — PREDNISONE 20 MG/1
TABLET ORAL
Qty: 18 TABLET | Refills: 0 | Status: SHIPPED | OUTPATIENT
Start: 2017-12-09 | End: 2018-07-15

## 2017-12-09 RX ORDER — PROMETHAZINE HYDROCHLORIDE AND DEXTROMETHORPHAN HYDROBROMIDE 6.25; 15 MG/5ML; MG/5ML
5 SYRUP ORAL
Qty: 120 ML | Refills: 0 | Status: SHIPPED | OUTPATIENT
Start: 2017-12-09 | End: 2017-12-19

## 2017-12-09 NOTE — DISCHARGE INSTRUCTIONS
RIGHT CARE - RIGHT PLACE - RIGHT TIME! Save the emergency room for EMERGENCIES! Utilize your primary care provider or urgent care center for non-emergent conditions including those in which you were just treated for.     Your primary care provider is who you should call to schedule checkups and other non-urgent medical appointments. Remember that your primary care provider knows you, your medical history and what medications you are on, providing continuity of care for you and your family.  If you do not have a primary care provider, please refer to the information below on how to obtain a primary care provider.     If it's an emergency, then you should utilize the Emergency Department (ED). An emergency is when a condition arises that you deem severe, oftentimes a life or death situation. Good examples are heart attack symptoms, stroke or a compound fracture - a bone break that protrudes through the skin. The ED is set up with the resources needed to effectively diagnose and treat life or death situations.

## 2017-12-09 NOTE — ED PROVIDER NOTES
Encounter Date: 12/9/2017       History     Chief Complaint   Patient presents with    Cough     x 1 week, non productive cough, congestion      The history is provided by the patient.   Sinusitis    This is a new problem. The current episode started several days ago (appproximately one week). The problem has been gradually worsening. The pain is at a severity of 9/10. The pain has been fluctuating since onset. Associated symptoms include chills, congestion, sinus pressure, sore throat, swollen glands and cough. Pertinent negatives include no ear pain, no hoarse voice and no shortness of breath. She has tried acetaminophen for the symptoms. The treatment provided no relief.       PCP:   Primary Doctor No        Review of patient's allergies indicates:  No Known Allergies  Past Medical History:   Diagnosis Date    Gastric ulcer     GERD (gastroesophageal reflux disease)      Past Surgical History:   Procedure Laterality Date    NO PAST SURGERIES       History reviewed. No pertinent family history.  Social History   Substance Use Topics    Smoking status: Current Every Day Smoker     Packs/day: 0.50     Types: Cigarettes    Smokeless tobacco: Never Used    Alcohol use No     Review of Systems   Constitutional: Positive for chills. Negative for fatigue and fever.   HENT: Positive for congestion, postnasal drip, sinus pain, sinus pressure and sore throat. Negative for ear pain and hoarse voice.    Eyes: Negative for visual disturbance.   Respiratory: Positive for cough and chest tightness. Negative for shortness of breath.    Cardiovascular: Negative for chest pain.   Gastrointestinal: Negative for abdominal pain, diarrhea, nausea and vomiting.   Genitourinary: Negative for dysuria.   Musculoskeletal: Negative for back pain and neck pain.   Skin: Negative for rash and wound.   Neurological: Positive for headaches (frontal). Negative for dizziness, weakness and numbness.   Hematological: Does not bruise/bleed  easily.       Physical Exam     Initial Vitals [12/09/17 1229]   BP Pulse Resp Temp SpO2   (!) 143/78 93 18 98.3 °F (36.8 °C) 100 %      MAP       99.67         Physical Exam    Nursing note and vitals reviewed.  Constitutional: She appears well-developed and well-nourished. She is cooperative. She does not appear ill. No distress.   HENT:   Head: Normocephalic and atraumatic.   Right Ear: Hearing, tympanic membrane, external ear and ear canal normal.   Left Ear: Hearing, tympanic membrane, external ear and ear canal normal.   Nose: Mucosal edema and sinus tenderness present. Right sinus exhibits maxillary sinus tenderness and frontal sinus tenderness. Left sinus exhibits maxillary sinus tenderness and frontal sinus tenderness.   Mouth/Throat: Uvula is midline and mucous membranes are normal. Oropharyngeal exudate and posterior oropharyngeal erythema present.   Eyes: Conjunctivae, EOM and lids are normal. Pupils are equal, round, and reactive to light.   Neck: Trachea normal and normal range of motion. Neck supple.   Cardiovascular: Normal rate, regular rhythm, intact distal pulses and normal pulses.   Pulmonary/Chest: Effort normal and breath sounds normal. No accessory muscle usage. No respiratory distress. She has no decreased breath sounds. She has no wheezes. She has no rhonchi. She has no rales.   Abdominal: Soft. She exhibits no distension and no mass. There is no tenderness. There is no rebound and no guarding.   Musculoskeletal: Normal range of motion. She exhibits no edema or tenderness.   Lymphadenopathy:     She has cervical adenopathy (tender and mobile bilaterally).        Right cervical: Superficial cervical adenopathy present.        Left cervical: Superficial cervical adenopathy present.   Neurological: She is alert and oriented to person, place, and time. She has normal strength. GCS eye subscore is 4. GCS verbal subscore is 5. GCS motor subscore is 6.   Neurovascular intact to all extremities.      Skin: Skin is warm, dry and intact. Capillary refill takes less than 2 seconds. No rash noted.   Psychiatric: She has a normal mood and affect. Her speech is normal and behavior is normal. Thought content normal.         ED Course   Procedures            Medical Decision Making:   History:   Old Records Summarized: records from clinic visits.                     Clinical Impression:       ICD-10-CM ICD-9-CM   1. Acute bacterial rhinosinusitis J01.90 461.9    B96.89    2. Acute nasopharyngitis J00 460   3. Elevated blood pressure reading without diagnosis of hypertension R03.0 796.2         Disposition:   Disposition: Discharged  Condition: Stable  I discussed with patient that the evaluation in the emergency department does not suggest any emergent or life threatening medical condition requiring immediate intervention beyond what was provided in the ED, and I believe patient is safe for discharge.  Regardless, an unremarkable evaluation in the ED does not preclude the development or presence of a serious of life threatening condition. As such, patient was instructed to return immediately for any worsening or change in current symptoms. I also discussed the results of my evaluation and diagnosis with patient and she concurs with the evaluation and management plan.  Detailed written and verbal instructions provided to patient and she expressed a verbal understanding of the discharge instructions and overall management plan. Reiterated the importance of medication administration and safety and advised patient to follow up with primary care provider in 3-5 days or sooner if needed.  Also advised patient to return to the ER for any complications.     Regarding SINUSITIS, for treatment, encouraged patient to refrain from smoking, drink plenty of fluids, rest, take medications as prescribed, and to use a humidifier or steam in the bathroom. Patient instructed to notify primary care provider if: they have a cough most  days or have a cough that returns frequently; are coughing up blood; have a high fever or shaking chills; have a low-grade fever for three or more days; develop thick, greenish mucus, especially if it has a bad smell; and feel short of breath or have chest pain, or return to emergency department for further evaluation. Also recommended that the patient shower in the morning and evening to wash away any allergens and help reduce the production of mucus, avoid taking decongestants if diagnosed with hypertension.  If decongestants were recommended, advised patient to not take for longer than 5 days to help prevent rebound congestion. For prevention, discussed with patient the importance of not smoking, getting annual influenza vaccines, reducing exposure to air pollution, and to frequently wash hands to avoid spread of infection. Instructed patient to follow up with primary care provider.    Regarding UPPER RESPIRATORY ILLNESS, for treatment, I encouraged patient to: drink plenty of fluids; get lots of rest; take medications as prescribed; use over-the-counter medications for symptomatic treatment;  and use a humidifier or steam in the bathroom to improve patency of upper airway.  Patient instructed to notify primary care provider, or return to the emergency department, if the they: have a cough most days or have a cough that returns frequently; begin coughing up blood; develop a high fever or shaking chills; have a low-grade fever for three or more days; develop thick, greenish mucus, especially if it has a bad smell; and experience shortness of breath or chest pain. For prevention, discussed with patient the importance of refraining from smoking (if applicable), getting annual influenza vaccines, reducing exposure to air pollution, and the need to frequently wash hands to avoid spread of infection.     Regarding ELEVATED BLOOD PRESSURE WITHOUT DIAGNOSIS OF HYPERTENSION/PRE-HYPERTENSION, I advised patient to: keep a  record of blood pressure results; avoid medications that contain heart stimulants, including over-the-counter drugs such as decongestants; maintain a healthy weight; cut back on sodium intake (i.e., limit canned, dried, packaged, and fast foods and dont add salt to food); follow the DASH (Dietary Approaches to Stop Hypertension) eating plan which recommends vegetables, fruits, whole gains, and other heart healthy foods; begin an exercise program that includes  aerobic exercise 3 to 4 times a week for an average of 40 minutes at a time (with approval of cardiologist or primary care provider); limit drinks that contain alcohol and caffeine; control levels of emotional stress; and seek emergency care for any shortness of breath, chest pain, difficulty speaking, confusion, or visual changes.  I also recommended following up with the primary care provider for re-evaluation of blood pressure and determine if further treatment may be required.         New Prescriptions    AMOXICILLIN-CLAVULANATE 875-125MG (AUGMENTIN) 875-125 MG PER TABLET    Take 1 tablet by mouth 2 (two) times daily.    PREDNISONE (DELTASONE) 20 MG TABLET    Take 1 tablet 3 times per day for 3 days, then  Take 1 tablet 2 times per day for 3 days, then   Take 1 tablet daily for 3 days    PROMETHAZINE-DEXTROMETHORPHAN (PROMETHAZINE-DM) 6.25-15 MG/5 ML SYRP    Take 5 mLs by mouth every 6 to 8 hours as needed (Cough).       Follow-up Information     Schedule an appointment as soon as possible for a visit  with [facility] [place].    Why:  To obtain a primary care provider  Contact information:  [id number]  [place] [id number] [phone]             Go to  [facility].    Specialty:  Urgent Care  Why:  As needed  Contact information:  [address] [facility]  [place] [address] [phone]                                  Mikael Brenner NP  12/09/17 7425

## 2018-01-04 ENCOUNTER — HOSPITAL ENCOUNTER (EMERGENCY)
Facility: HOSPITAL | Age: 28
Discharge: HOME | End: 2018-01-04
Payer: MEDICAID

## 2018-01-04 VITALS
SYSTOLIC BLOOD PRESSURE: 133 MMHG | TEMPERATURE: 98 F | BODY MASS INDEX: 24.8 KG/M2 | RESPIRATION RATE: 20 BRPM | OXYGEN SATURATION: 100 % | DIASTOLIC BLOOD PRESSURE: 72 MMHG | HEIGHT: 67 IN | HEART RATE: 91 BPM | WEIGHT: 158 LBS

## 2018-01-04 DIAGNOSIS — R68.89 FLU-LIKE SYMPTOMS: Primary | ICD-10-CM

## 2018-01-04 DIAGNOSIS — R52 BODY ACHES: ICD-10-CM

## 2018-01-04 DIAGNOSIS — R05.9 COUGH: ICD-10-CM

## 2018-01-04 PROCEDURE — 99283 EMERGENCY DEPT VISIT LOW MDM: CPT

## 2018-01-04 RX ORDER — NAPROXEN 500 MG/1
500 TABLET ORAL 2 TIMES DAILY WITH MEALS
Qty: 12 TABLET | Refills: 0 | Status: SHIPPED | OUTPATIENT
Start: 2018-01-04 | End: 2018-07-15

## 2018-01-04 NOTE — ED PROVIDER NOTES
"   History      Chief Complaint   Patient presents with    Nasal Congestion     "im sick as a dog"       Review of patient's allergies indicates:  No Known Allergies     HPI   HPI    1/4/2018, 4:17 PM   History obtained from the patient      History of Present Illness: Alessandra Sy is a 27 y.o. female patient who presents to the Emergency Department for flu-like symptoms, cough, nasal congestion, fever, body aches, for 5-6 days. Also sore to left nares.  Denies n/v/d.  Symptoms are constant and moderate in severity.  No mitigating or exacerbating factors reported.   No further complaints or concerns at this time.           PCP: Primary Doctor No       Past Medical History:  Past Medical History:   Diagnosis Date    Gastric ulcer     GERD (gastroesophageal reflux disease)          Past Surgical History:  Past Surgical History:   Procedure Laterality Date    NO PAST SURGERIES             Family History:  No family history on file.        Social History:  Social History     Social History Main Topics    Smoking status: Current Every Day Smoker     Packs/day: 0.50     Types: Cigarettes    Smokeless tobacco: Never Used    Alcohol use No    Drug use: No    Sexual activity: Yes     Partners: Female     Birth control/ protection: None       ROS   Review of Systems   Constitutional: Positive for chills and fever. Negative for appetite change.   HENT: Negative for sore throat and trouble swallowing.    Respiratory: Positive for cough. Negative for shortness of breath.    Cardiovascular: Negative for chest pain.   Gastrointestinal: Negative for abdominal pain and vomiting.   Genitourinary: Negative for dysuria and flank pain.   Musculoskeletal: Negative for back pain, neck pain and neck stiffness.   Skin: Negative for pallor.   Neurological: Negative for syncope and weakness.   Hematological: Does not bruise/bleed easily.   All other systems reviewed and are negative.      Review of Systems    Physical Exam  " "      Initial Vitals [01/04/18 1501]   BP Pulse Resp Temp SpO2   133/72 91 20 97.9 °F (36.6 °C) 100 %      MAP       92.33         Physical Exam  Vital signs and nursing notes reviewed.  Constitutional: Patient is in NAD. Awake and alert. Well-developed and well-nourished.  Head: Atraumatic. Normocephalic.  Eyes: PERRL. EOM intact. Conjunctivae nl. No scleral icterus.  ENT: Mucous membranes are moist. Oropharynx is mildly erythematous, no exudates.  Nasal congestion.  Left nares with small scabby area, no edema.  Neck: Supple. No JVD. No lymphadenopathy.  No meningismus  Cardiovascular: Regular rate and rhythm. No murmurs, rubs, or gallops. Distal pulses are 2+ and symmetric.  Pulmonary/Chest: No respiratory distress. Clear to auscultation bilaterally. No wheezing, rales, or rhonchi.  Abdominal: Soft. Non-distended. No TTP. No rebound, guarding, or rigidity. Good bowel sounds.  Genitourinary: No CVA tenderness  Musculoskeletal: Moves all extremities. No edema.   Skin: Warm and dry.  No rash  Neurological: Awake and alert. No acute focal neurological deficits are appreciated.  Psychiatric: Normal affect. Good eye contact. Appropriate in content.      ED Course      Procedures  ED Vital Signs:  Vitals:    01/04/18 1501   BP: 133/72   Pulse: 91   Resp: 20   Temp: 97.9 °F (36.6 °C)   TempSrc: Oral   SpO2: 100%   Weight: 71.7 kg (158 lb)   Height: 5' 7" (1.702 m)                 Imaging Results:  Imaging Results    None            The Emergency Provider reviewed the vital signs and test results, which are outlined above.    ED Discussion         All findings were reviewed with the patient/family in detail.  Findings seem to be most consistent with a diagnosis of flu/flu-like illness.  All remaining questions and concerns were addressed at that time.  Patient/family has been counseled regarding the need for follow-up as well as the indication to return to the emergency room should new or worrisome developments " occur.        Medication(s) given in the ER:  Medications - No data to display        Follow-up Information     Primary Doctor No In 2 days.                     New Prescriptions    MUPIROCIN CALCIUM 2% NASL OINT (BACTROBAN) 2 % OINT    by Nasal route 2 (two) times daily.    NAPROXEN (NAPROSYN) 500 MG TABLET    Take 1 tablet (500 mg total) by mouth 2 (two) times daily with meals. Prn pain          Medical Decision Making        MDM               Clinical Impression:        ICD-10-CM ICD-9-CM   1. Flu-like symptoms R68.89 780.99   2. Cough R05 786.2   3. Body aches R52 780.96             Danae Jones PA-C  01/04/18 6696

## 2018-07-15 ENCOUNTER — HOSPITAL ENCOUNTER (EMERGENCY)
Facility: HOSPITAL | Age: 28
Discharge: HOME | End: 2018-07-15
Attending: EMERGENCY MEDICINE
Payer: MEDICAID

## 2018-07-15 VITALS
HEART RATE: 84 BPM | BODY MASS INDEX: 23.94 KG/M2 | DIASTOLIC BLOOD PRESSURE: 53 MMHG | HEIGHT: 67 IN | RESPIRATION RATE: 18 BRPM | OXYGEN SATURATION: 99 % | SYSTOLIC BLOOD PRESSURE: 109 MMHG | WEIGHT: 152.56 LBS | TEMPERATURE: 98 F

## 2018-07-15 DIAGNOSIS — N83.202 BILATERAL OVARIAN CYSTS: ICD-10-CM

## 2018-07-15 DIAGNOSIS — K52.9 ENTERITIS: ICD-10-CM

## 2018-07-15 DIAGNOSIS — N83.201 BILATERAL OVARIAN CYSTS: ICD-10-CM

## 2018-07-15 DIAGNOSIS — J06.9 UPPER RESPIRATORY TRACT INFECTION, UNSPECIFIED TYPE: Primary | ICD-10-CM

## 2018-07-15 LAB
ALBUMIN SERPL BCP-MCNC: 4.1 G/DL
ALP SERPL-CCNC: 50 U/L
ALT SERPL W/O P-5'-P-CCNC: 13 U/L
AMYLASE SERPL-CCNC: 55 U/L
ANION GAP SERPL CALC-SCNC: 9 MMOL/L
AST SERPL-CCNC: 18 U/L
B-HCG UR QL: NEGATIVE
BASOPHILS # BLD AUTO: 0.05 K/UL
BASOPHILS NFR BLD: 0.3 %
BILIRUB SERPL-MCNC: 0.5 MG/DL
BILIRUB UR QL STRIP: NEGATIVE
BUN SERPL-MCNC: 7 MG/DL
CALCIUM SERPL-MCNC: 9 MG/DL
CHLORIDE SERPL-SCNC: 106 MMOL/L
CLARITY UR REFRACT.AUTO: CLEAR
CO2 SERPL-SCNC: 25 MMOL/L
COLOR UR AUTO: YELLOW
CREAT SERPL-MCNC: 0.9 MG/DL
DIFFERENTIAL METHOD: ABNORMAL
EOSINOPHIL # BLD AUTO: 0.2 K/UL
EOSINOPHIL NFR BLD: 1.5 %
ERYTHROCYTE [DISTWIDTH] IN BLOOD BY AUTOMATED COUNT: 14 %
EST. GFR  (AFRICAN AMERICAN): >60 ML/MIN/1.73 M^2
EST. GFR  (NON AFRICAN AMERICAN): >60 ML/MIN/1.73 M^2
GLUCOSE SERPL-MCNC: 106 MG/DL
GLUCOSE UR QL STRIP: NEGATIVE
HCT VFR BLD AUTO: 40.2 %
HGB BLD-MCNC: 13.8 G/DL
HGB UR QL STRIP: NEGATIVE
KETONES UR QL STRIP: NEGATIVE
LEUKOCYTE ESTERASE UR QL STRIP: NEGATIVE
LIPASE SERPL-CCNC: 48 U/L
LYMPHOCYTES # BLD AUTO: 2.7 K/UL
LYMPHOCYTES NFR BLD: 16.5 %
MCH RBC QN AUTO: 31.7 PG
MCHC RBC AUTO-ENTMCNC: 34.3 G/DL
MCV RBC AUTO: 92 FL
MONOCYTES # BLD AUTO: 1 K/UL
MONOCYTES NFR BLD: 6.2 %
NEUTROPHILS # BLD AUTO: 12.4 K/UL
NEUTROPHILS NFR BLD: 75.3 %
NITRITE UR QL STRIP: NEGATIVE
PH UR STRIP: 7 [PH] (ref 5–8)
PLATELET # BLD AUTO: 394 K/UL
PMV BLD AUTO: 8.7 FL
POTASSIUM SERPL-SCNC: 3.9 MMOL/L
PROT SERPL-MCNC: 6.9 G/DL
PROT UR QL STRIP: NEGATIVE
RBC # BLD AUTO: 4.36 M/UL
SODIUM SERPL-SCNC: 140 MMOL/L
SP GR UR STRIP: 1.02 (ref 1–1.03)
URN SPEC COLLECT METH UR: ABNORMAL
UROBILINOGEN UR STRIP-ACNC: ABNORMAL EU/DL
WBC # BLD AUTO: 16.41 K/UL

## 2018-07-15 PROCEDURE — 81025 URINE PREGNANCY TEST: CPT

## 2018-07-15 PROCEDURE — 25500020 PHARM REV CODE 255: Performed by: EMERGENCY MEDICINE

## 2018-07-15 PROCEDURE — 83690 ASSAY OF LIPASE: CPT

## 2018-07-15 PROCEDURE — 81003 URINALYSIS AUTO W/O SCOPE: CPT

## 2018-07-15 PROCEDURE — 85025 COMPLETE CBC W/AUTO DIFF WBC: CPT

## 2018-07-15 PROCEDURE — 80053 COMPREHEN METABOLIC PANEL: CPT

## 2018-07-15 PROCEDURE — 82150 ASSAY OF AMYLASE: CPT

## 2018-07-15 PROCEDURE — 99284 EMERGENCY DEPT VISIT MOD MDM: CPT | Mod: 25

## 2018-07-15 PROCEDURE — 25000003 PHARM REV CODE 250: Performed by: EMERGENCY MEDICINE

## 2018-07-15 RX ORDER — METRONIDAZOLE 500 MG/1
500 TABLET ORAL 3 TIMES DAILY
Qty: 30 TABLET | Refills: 0 | Status: SHIPPED | OUTPATIENT
Start: 2018-07-15 | End: 2018-07-15

## 2018-07-15 RX ORDER — ONDANSETRON 4 MG/1
4 TABLET, ORALLY DISINTEGRATING ORAL EVERY 6 HOURS PRN
Qty: 12 TABLET | Refills: 0 | Status: SHIPPED | OUTPATIENT
Start: 2018-07-15 | End: 2018-07-15

## 2018-07-15 RX ORDER — CIPROFLOXACIN 500 MG/1
500 TABLET ORAL
Status: COMPLETED | OUTPATIENT
Start: 2018-07-15 | End: 2018-07-15

## 2018-07-15 RX ORDER — CETIRIZINE HYDROCHLORIDE 10 MG/1
10 TABLET ORAL DAILY
Qty: 30 TABLET | Refills: 0 | COMMUNITY
Start: 2018-07-15 | End: 2019-11-27

## 2018-07-15 RX ORDER — ONDANSETRON 4 MG/1
4 TABLET, ORALLY DISINTEGRATING ORAL EVERY 6 HOURS PRN
Qty: 12 TABLET | Refills: 0 | Status: SHIPPED | OUTPATIENT
Start: 2018-07-15 | End: 2019-11-27

## 2018-07-15 RX ORDER — CIPROFLOXACIN 500 MG/1
500 TABLET ORAL 2 TIMES DAILY
Qty: 20 TABLET | Refills: 0 | Status: SHIPPED | OUTPATIENT
Start: 2018-07-15 | End: 2018-07-15

## 2018-07-15 RX ORDER — METRONIDAZOLE 500 MG/1
500 TABLET ORAL 3 TIMES DAILY
Qty: 30 TABLET | Refills: 0 | Status: SHIPPED | OUTPATIENT
Start: 2018-07-15 | End: 2018-07-25

## 2018-07-15 RX ORDER — METRONIDAZOLE 500 MG/1
500 TABLET ORAL
Status: COMPLETED | OUTPATIENT
Start: 2018-07-15 | End: 2018-07-15

## 2018-07-15 RX ORDER — CIPROFLOXACIN 500 MG/1
500 TABLET ORAL 2 TIMES DAILY
Qty: 20 TABLET | Refills: 0 | Status: SHIPPED | OUTPATIENT
Start: 2018-07-15 | End: 2018-07-25

## 2018-07-15 RX ADMIN — IOHEXOL 75 ML: 350 INJECTION, SOLUTION INTRAVENOUS at 03:07

## 2018-07-15 RX ADMIN — CIPROFLOXACIN 500 MG: 500 TABLET, FILM COATED ORAL at 05:07

## 2018-07-15 RX ADMIN — METRONIDAZOLE 500 MG: 500 TABLET ORAL at 05:07

## 2018-07-15 NOTE — ED NOTES
MD aware of pt's vital signs & states OK for discharge home at this time. Pt is stable, in NAD, RR even & unlabored. Pt denies any further needs, questions, concerns or complaints. Will d/c per order.

## 2018-07-15 NOTE — ED PROVIDER NOTES
Encounter Date: 7/15/2018       History     Chief Complaint   Patient presents with    Cough     x 2 days. Non-productive. Reports chest wall soreness s/t incessant cough.      Patient currently presents with a chief complaint of cough.  Onset was noted 2 days ago.  There is associated rhinorrhea and congestion.  Fever and chills are denied.  Vomiting and diarrhea are denied.  Over-the-counter remedies have not been attempted.  There has not been purulent nasal discharge. Cough has been nonproductive.      Patient reports a second complaint of abdominal pain.  Onset of this event was first noted 3 - 4d ago.  This is localized to the LLQ.  This discomfort is described as cramping in nature.  There are associated changes in bowel habits with 3 - 4 bouts of diarrhea per day.  There has not been associated emesis.  There are not associated urinary complaints.  This is a recurring problem with the patient describing that this is similar to prior episodes of where she had ruptured ovarian cysts.  Patient denies fever.              Review of patient's allergies indicates:  No Known Allergies  Past Medical History:   Diagnosis Date    Gastric ulcer     GERD (gastroesophageal reflux disease)     Hx of ovarian cyst      Past Surgical History:   Procedure Laterality Date    NO PAST SURGERIES       History reviewed. No pertinent family history.  Social History   Substance Use Topics    Smoking status: Current Every Day Smoker     Packs/day: 0.50     Types: Cigarettes    Smokeless tobacco: Never Used    Alcohol use No     Review of Systems   Constitutional: Negative for chills and fever.   HENT: Positive for postnasal drip and rhinorrhea. Negative for congestion.    Respiratory: Positive for cough. Negative for chest tightness, shortness of breath and wheezing.    Cardiovascular: Negative for chest pain, palpitations and leg swelling.   Gastrointestinal: Positive for abdominal pain and diarrhea. Negative for abdominal  distention, constipation, nausea and vomiting.   Genitourinary: Negative for dysuria, frequency, urgency, vaginal bleeding and vaginal discharge.   Skin: Negative for color change and rash.   Allergic/Immunologic: Negative for immunocompromised state.   Neurological: Negative for dizziness, weakness and numbness.   Hematological: Negative for adenopathy. Does not bruise/bleed easily.   All other systems reviewed and are negative.      Physical Exam     Initial Vitals [07/15/18 0140]   BP Pulse Resp Temp SpO2   137/77 100 18 98.6 °F (37 °C) 99 %      MAP       --         Physical Exam    Nursing note and vitals reviewed.  Constitutional: She appears well-developed and well-nourished. She is not diaphoretic. No distress.   HENT:   Head: Normocephalic and atraumatic.   Right Ear: External ear normal.   Left Ear: External ear normal.   Nose: Nose normal.   Mouth/Throat: Oropharynx is clear and moist.   Eyes: Conjunctivae and EOM are normal. Pupils are equal, round, and reactive to light. No scleral icterus.   Neck: Neck supple. No tracheal deviation present. No JVD present.   Cardiovascular: Normal rate, regular rhythm, normal heart sounds and intact distal pulses. Exam reveals no gallop and no friction rub.    No murmur heard.  Pulmonary/Chest: Breath sounds normal. No respiratory distress. She has no wheezes. She has no rhonchi. She has no rales.   Abdominal: Soft. Bowel sounds are normal. She exhibits no distension and no mass. There is tenderness (LLQ - mild-mod). There is no rebound and no guarding.   Musculoskeletal: Normal range of motion. She exhibits no edema.   Neurological: She is alert and oriented to person, place, and time. She has normal strength. No cranial nerve deficit or sensory deficit.   Skin: Skin is warm and dry. No rash noted.   Psychiatric: She has a normal mood and affect. Her behavior is normal.         ED Course   Procedures  Labs Reviewed   CBC W/ AUTO DIFFERENTIAL - Abnormal; Notable for  the following:        Result Value    WBC 16.41 (*)     MCH 31.7 (*)     Platelets 394 (*)     MPV 8.7 (*)     Gran # (ANC) 12.4 (*)     Gran% 75.3 (*)     Lymph% 16.5 (*)     All other components within normal limits   COMPREHENSIVE METABOLIC PANEL - Abnormal; Notable for the following:     Alkaline Phosphatase 50 (*)     All other components within normal limits   URINALYSIS - Abnormal; Notable for the following:     Urobilinogen, UA 2.0-3.0 (*)     All other components within normal limits   PREGNANCY TEST, URINE RAPID   LIPASE   AMYLASE          Imaging Results          CT Abdomen Pelvis With Contrast (In process)                X-Ray Abdomen Flat And Erect (In process)                            Medical Decision Making:   ED Management:  All findings were reviewed with the patient/family in detail along with the diagnosis of enteritis as well as bilateral ovarian cysts and URI.  Patient unfortunately able to offer stool specimen at this time.  I see no indication of an emergent process beyond that addressed during our encounter but have duly counseled the patient/family regarding the need for prompt follow-up as well as the indications that should prompt immediate return to the emergency room should new or worrisome developments occur.  The patient/family communicates understanding of all this information and all remaining questions and concerns were addressed at this time.                          Clinical Impression:   The primary encounter diagnosis was Upper respiratory tract infection, unspecified type. Diagnoses of Enteritis and Bilateral ovarian cysts were also pertinent to this visit.                             Ar Vera MD  07/15/18 0577

## 2018-07-15 NOTE — ED NOTES
Pt is aaoX4, resp e/u, nad. Pt is aware of poc, and she denies having any needs at this time. Will continue to monitor.

## 2018-10-26 ENCOUNTER — HOSPITAL ENCOUNTER (EMERGENCY)
Facility: HOSPITAL | Age: 28
Discharge: HOME | End: 2018-10-26
Attending: EMERGENCY MEDICINE
Payer: MEDICAID

## 2018-10-26 VITALS
DIASTOLIC BLOOD PRESSURE: 63 MMHG | WEIGHT: 131.38 LBS | RESPIRATION RATE: 18 BRPM | HEART RATE: 91 BPM | TEMPERATURE: 98 F | OXYGEN SATURATION: 98 % | SYSTOLIC BLOOD PRESSURE: 128 MMHG | BODY MASS INDEX: 20.58 KG/M2

## 2018-10-26 DIAGNOSIS — B07.9 VIRAL WART ON FINGER: Primary | ICD-10-CM

## 2018-10-26 PROCEDURE — 99283 EMERGENCY DEPT VISIT LOW MDM: CPT

## 2018-10-26 RX ORDER — SALICYLIC ACID 260 MG/ML
1 LIQUID TOPICAL DAILY
Qty: 10 ML | Refills: 2 | Status: SHIPPED | OUTPATIENT
Start: 2018-10-26 | End: 2018-11-25

## 2018-10-27 NOTE — ED PROVIDER NOTES
Encounter Date: 10/26/2018       History     Chief Complaint   Patient presents with    Skin Problem     Wart to left middle finger, pt states is hurting worse than usual.      Painful raised wart, dorsum left middle finger, chronic, has not been treated. No other complaints.      The history is provided by the patient. No  was used.     Review of patient's allergies indicates:  No Known Allergies  Past Medical History:   Diagnosis Date    Bipolar 1 disorder     Borderline personality disorder     Depression     Gastric ulcer     GERD (gastroesophageal reflux disease)     Hx of ovarian cyst      Past Surgical History:   Procedure Laterality Date    NO PAST SURGERIES       History reviewed. No pertinent family history.  Social History     Tobacco Use    Smoking status: Current Every Day Smoker     Packs/day: 0.50     Types: Cigarettes    Smokeless tobacco: Never Used   Substance Use Topics    Alcohol use: No    Drug use: No     Review of Systems   Constitutional: Negative for activity change, fatigue and fever.   HENT: Negative for congestion, ear pain, facial swelling, nosebleeds, sinus pressure and sore throat.    Eyes: Negative for pain, discharge, redness and visual disturbance.   Respiratory: Negative for cough, choking, chest tightness, shortness of breath and wheezing.    Cardiovascular: Negative for chest pain, palpitations and leg swelling.   Gastrointestinal: Negative for abdominal distention, abdominal pain, nausea and vomiting.   Endocrine: Negative for heat intolerance, polydipsia and polyuria.   Genitourinary: Negative for difficulty urinating, dysuria, flank pain, hematuria and urgency.   Musculoskeletal: Negative for back pain, gait problem, joint swelling and myalgias.   Skin: Negative for color change and rash.        See HPI   Allergic/Immunologic: Negative for environmental allergies and food allergies.   Neurological: Negative for dizziness, weakness, numbness and  headaches.   Hematological: Negative for adenopathy. Does not bruise/bleed easily.   Psychiatric/Behavioral: Negative for agitation and behavioral problems. The patient is not nervous/anxious.    All other systems reviewed and are negative.      Physical Exam     Initial Vitals [10/26/18 2310]   BP Pulse Resp Temp SpO2   128/63 91 18 98.4 °F (36.9 °C) 98 %      MAP       --         Physical Exam    Nursing note and vitals reviewed.  Constitutional: She appears well-developed and well-nourished. She is not diaphoretic. No distress.   HENT:   Head: Normocephalic and atraumatic.   Mouth/Throat: No oropharyngeal exudate.   Eyes: Conjunctivae and EOM are normal. Pupils are equal, round, and reactive to light. Right eye exhibits no discharge. Left eye exhibits no discharge. No scleral icterus.   Neck: Normal range of motion. Neck supple. No thyromegaly present. No tracheal deviation present. No JVD present.   Cardiovascular: Normal rate, regular rhythm, normal heart sounds and intact distal pulses. Exam reveals no gallop and no friction rub.    No murmur heard.  Pulmonary/Chest: Breath sounds normal. No stridor. No respiratory distress. She has no wheezes. She has no rhonchi. She has no rales. She exhibits no tenderness.   Abdominal: Soft. Bowel sounds are normal. She exhibits no distension and no mass. There is no tenderness. There is no rebound and no guarding.   Musculoskeletal: Normal range of motion. She exhibits no edema or tenderness.   Neurological: She is alert and oriented to person, place, and time. She has normal strength.   Skin: Skin is warm and dry. No rash and no abscess noted. No erythema.   Uncomplicated wart dorsum left middle finger   Psychiatric: She has a normal mood and affect. Her behavior is normal. Judgment and thought content normal.         ED Course   Procedures  Labs Reviewed - No data to display       Imaging Results    None                               Clinical Impression:     1. Viral  wart on finger          Disposition:   Disposition: Discharged  Condition: Stable                        Anthony Mccullough MD  10/26/18 5315

## 2019-07-04 ENCOUNTER — HOSPITAL ENCOUNTER (EMERGENCY)
Facility: HOSPITAL | Age: 29
Discharge: HOME | End: 2019-07-05
Attending: FAMILY MEDICINE
Payer: MEDICAID

## 2019-07-04 DIAGNOSIS — K52.9 GASTROENTERITIS: Primary | ICD-10-CM

## 2019-07-04 LAB
BASOPHILS # BLD AUTO: 0.04 K/UL (ref 0–0.2)
BASOPHILS NFR BLD: 0.2 % (ref 0–1.9)
DIFFERENTIAL METHOD: ABNORMAL
EOSINOPHIL # BLD AUTO: 0.4 K/UL (ref 0–0.5)
EOSINOPHIL NFR BLD: 2.3 % (ref 0–8)
ERYTHROCYTE [DISTWIDTH] IN BLOOD BY AUTOMATED COUNT: 13.7 % (ref 11.5–14.5)
HCT VFR BLD AUTO: 45.9 % (ref 37–48.5)
HGB BLD-MCNC: 15.7 G/DL (ref 12–16)
LYMPHOCYTES # BLD AUTO: 0.9 K/UL (ref 1–4.8)
LYMPHOCYTES NFR BLD: 4.8 % (ref 18–48)
MCH RBC QN AUTO: 31.3 PG (ref 27–31)
MCHC RBC AUTO-ENTMCNC: 34.2 G/DL (ref 32–36)
MCV RBC AUTO: 91 FL (ref 82–98)
MONOCYTES # BLD AUTO: 0.9 K/UL (ref 0.3–1)
MONOCYTES NFR BLD: 4.8 % (ref 4–15)
NEUTROPHILS # BLD AUTO: 16.6 K/UL (ref 1.8–7.7)
NEUTROPHILS NFR BLD: 87.9 % (ref 38–73)
PLATELET # BLD AUTO: 465 K/UL (ref 150–350)
PMV BLD AUTO: 8.3 FL (ref 9.2–12.9)
RBC # BLD AUTO: 5.02 M/UL (ref 4–5.4)
WBC # BLD AUTO: 18.92 K/UL (ref 3.9–12.7)

## 2019-07-04 PROCEDURE — 96365 THER/PROPH/DIAG IV INF INIT: CPT | Mod: ER

## 2019-07-04 PROCEDURE — 85025 COMPLETE CBC W/AUTO DIFF WBC: CPT | Mod: ER

## 2019-07-04 PROCEDURE — 63600175 PHARM REV CODE 636 W HCPCS: Mod: ER | Performed by: FAMILY MEDICINE

## 2019-07-04 PROCEDURE — 80053 COMPREHEN METABOLIC PANEL: CPT | Mod: ER

## 2019-07-04 PROCEDURE — 25000003 PHARM REV CODE 250: Mod: ER | Performed by: FAMILY MEDICINE

## 2019-07-04 PROCEDURE — 99285 EMERGENCY DEPT VISIT HI MDM: CPT | Mod: 25,ER

## 2019-07-04 PROCEDURE — 83690 ASSAY OF LIPASE: CPT | Mod: ER

## 2019-07-04 RX ORDER — TRAZODONE HYDROCHLORIDE 50 MG/1
50 TABLET ORAL NIGHTLY
COMMUNITY
End: 2019-11-27

## 2019-07-04 RX ORDER — DEXTROAMPHETAMINE SACCHARATE, AMPHETAMINE ASPARTATE MONOHYDRATE, DEXTROAMPHETAMINE SULFATE AND AMPHETAMINE SULFATE 2.5; 2.5; 2.5; 2.5 MG/1; MG/1; MG/1; MG/1
10 CAPSULE, EXTENDED RELEASE ORAL 3 TIMES DAILY
COMMUNITY

## 2019-07-04 RX ORDER — ONDANSETRON 2 MG/ML
4 INJECTION INTRAMUSCULAR; INTRAVENOUS
Status: DISCONTINUED | OUTPATIENT
Start: 2019-07-04 | End: 2019-07-04

## 2019-07-04 RX ORDER — ARIPIPRAZOLE 10 MG/1
10 TABLET, ORALLY DISINTEGRATING ORAL DAILY
COMMUNITY
End: 2019-11-27

## 2019-07-04 RX ORDER — BUPRENORPHINE 2 MG/1
2 TABLET SUBLINGUAL DAILY
COMMUNITY
End: 2019-11-27

## 2019-07-04 RX ORDER — FLUOXETINE HYDROCHLORIDE 20 MG/1
20 CAPSULE ORAL DAILY
COMMUNITY

## 2019-07-04 RX ADMIN — PROMETHAZINE HYDROCHLORIDE 12.5 MG: 25 INJECTION INTRAMUSCULAR; INTRAVENOUS at 11:07

## 2019-07-04 RX ADMIN — SODIUM CHLORIDE 1000 ML: 0.9 INJECTION, SOLUTION INTRAVENOUS at 11:07

## 2019-07-05 VITALS
RESPIRATION RATE: 20 BRPM | HEART RATE: 78 BPM | DIASTOLIC BLOOD PRESSURE: 65 MMHG | SYSTOLIC BLOOD PRESSURE: 115 MMHG | WEIGHT: 143.94 LBS | HEIGHT: 66 IN | BODY MASS INDEX: 23.13 KG/M2 | OXYGEN SATURATION: 99 % | TEMPERATURE: 98 F

## 2019-07-05 LAB
ALBUMIN SERPL BCP-MCNC: 4.6 G/DL (ref 3.5–5.2)
ALP SERPL-CCNC: 68 U/L (ref 55–135)
ALT SERPL W/O P-5'-P-CCNC: 16 U/L (ref 10–44)
AMPHET+METHAMPHET UR QL: NORMAL
ANION GAP SERPL CALC-SCNC: 13 MMOL/L (ref 8–16)
AST SERPL-CCNC: 21 U/L (ref 10–40)
B-HCG UR QL: NEGATIVE
BARBITURATES UR QL SCN>200 NG/ML: NEGATIVE
BENZODIAZ UR QL SCN>200 NG/ML: NEGATIVE
BILIRUB SERPL-MCNC: 1.4 MG/DL (ref 0.1–1)
BILIRUB UR QL STRIP: NEGATIVE
BUN SERPL-MCNC: 14 MG/DL (ref 6–20)
BZE UR QL SCN: NEGATIVE
CALCIUM SERPL-MCNC: 9.2 MG/DL (ref 8.7–10.5)
CANNABINOIDS UR QL SCN: NEGATIVE
CHLORIDE SERPL-SCNC: 104 MMOL/L (ref 95–110)
CLARITY UR REFRACT.AUTO: CLEAR
CO2 SERPL-SCNC: 21 MMOL/L (ref 23–29)
COLOR UR AUTO: YELLOW
CREAT SERPL-MCNC: 0.8 MG/DL (ref 0.5–1.4)
CREAT UR-MCNC: 278.4 MG/DL (ref 15–325)
EST. GFR  (AFRICAN AMERICAN): >60 ML/MIN/1.73 M^2
EST. GFR  (NON AFRICAN AMERICAN): >60 ML/MIN/1.73 M^2
GLUCOSE SERPL-MCNC: 91 MG/DL (ref 70–110)
GLUCOSE UR QL STRIP: NEGATIVE
HGB UR QL STRIP: NEGATIVE
KETONES UR QL STRIP: ABNORMAL
LEUKOCYTE ESTERASE UR QL STRIP: NEGATIVE
LIPASE SERPL-CCNC: 15 U/L (ref 4–60)
METHADONE UR QL SCN>300 NG/ML: NEGATIVE
NITRITE UR QL STRIP: NEGATIVE
OPIATES UR QL SCN: NEGATIVE
PCP UR QL SCN>25 NG/ML: NEGATIVE
PH UR STRIP: 5 [PH] (ref 5–8)
POTASSIUM SERPL-SCNC: 4.1 MMOL/L (ref 3.5–5.1)
PROT SERPL-MCNC: 8.2 G/DL (ref 6–8.4)
PROT UR QL STRIP: ABNORMAL
SODIUM SERPL-SCNC: 138 MMOL/L (ref 136–145)
SP GR UR STRIP: 1.02 (ref 1–1.03)
TOXICOLOGY INFORMATION: NORMAL
URN SPEC COLLECT METH UR: ABNORMAL
UROBILINOGEN UR STRIP-ACNC: NEGATIVE EU/DL

## 2019-07-05 PROCEDURE — 80307 DRUG TEST PRSMV CHEM ANLYZR: CPT | Mod: ER

## 2019-07-05 PROCEDURE — 25500020 PHARM REV CODE 255: Mod: ER | Performed by: FAMILY MEDICINE

## 2019-07-05 PROCEDURE — 81003 URINALYSIS AUTO W/O SCOPE: CPT | Mod: ER,59

## 2019-07-05 PROCEDURE — 81025 URINE PREGNANCY TEST: CPT | Mod: ER

## 2019-07-05 RX ORDER — ONDANSETRON 4 MG/1
4 TABLET, FILM COATED ORAL EVERY 6 HOURS
Qty: 20 TABLET | Refills: 0 | Status: SHIPPED | OUTPATIENT
Start: 2019-07-05 | End: 2019-07-10

## 2019-07-05 RX ORDER — PROMETHAZINE HYDROCHLORIDE 25 MG/1
12.5 TABLET ORAL EVERY 6 HOURS PRN
Qty: 8 TABLET | Refills: 0 | Status: SHIPPED | OUTPATIENT
Start: 2019-07-05 | End: 2019-07-07

## 2019-07-05 RX ORDER — DICYCLOMINE HYDROCHLORIDE 20 MG/1
20 TABLET ORAL 2 TIMES DAILY
Qty: 20 TABLET | Refills: 0 | Status: SHIPPED | OUTPATIENT
Start: 2019-07-05 | End: 2019-07-15

## 2019-07-05 RX ADMIN — IOHEXOL 75 ML: 350 INJECTION, SOLUTION INTRAVENOUS at 01:07

## 2019-07-05 NOTE — ED PROVIDER NOTES
Encounter Date: 7/4/2019       History     Chief Complaint   Patient presents with    Emesis     c/o nausea and vomiting since yesterday     This is a 28-year-old  female presents emergency department with 2 day history of nausea vomiting.  States that she also some abdominal pain. Denies any diarrhea constipation.  Last bowel movement was earlier today which was normal. Denies any urinary symptoms including hematuria or dysuria.  Is currently on her menstrual cycle.  Denies any new vaginal discharge or sexual partners.        Review of patient's allergies indicates:  No Known Allergies  Past Medical History:   Diagnosis Date    Bipolar 1 disorder     Borderline personality disorder     Depression     Gastric ulcer     GERD (gastroesophageal reflux disease)     Hx of ovarian cyst      Past Surgical History:   Procedure Laterality Date    NO PAST SURGERIES       History reviewed. No pertinent family history.  Social History     Tobacco Use    Smoking status: Current Every Day Smoker     Packs/day: 0.50     Types: Cigarettes    Smokeless tobacco: Never Used   Substance Use Topics    Alcohol use: No    Drug use: No     Review of Systems   Constitutional: Negative for chills, diaphoresis and fever.   HENT: Negative for congestion, postnasal drip, rhinorrhea, sneezing and sore throat.    Eyes: Negative for visual disturbance.   Respiratory: Negative for cough, chest tightness and shortness of breath.    Cardiovascular: Negative for chest pain, palpitations and leg swelling.   Gastrointestinal: Positive for abdominal pain, nausea and vomiting. Negative for constipation.   Genitourinary: Negative for dysuria, frequency, hematuria and urgency.   Musculoskeletal: Negative for back pain, gait problem and myalgias.   Skin: Negative for rash.   Neurological: Negative for weakness, light-headedness, numbness and headaches.   Hematological: Does not bruise/bleed easily.   All other systems reviewed and are  negative.      Physical Exam     Initial Vitals [07/04/19 2327]   BP Pulse Resp Temp SpO2   114/73 89 20 98 °F (36.7 °C) 98 %      MAP       --         ED Course Vitals  Vitals:    07/05/19 0001 07/05/19 0017 07/05/19 0118 07/05/19 0146   BP: (!) 107/57 105/75 110/76 115/65   Pulse: 78 79 88 78   Resp:       Temp:       TempSrc:       SpO2: 100% 100% 100% 99%   Weight:       Height:           Physical Exam    Nursing note and vitals reviewed.  Constitutional: She appears well-developed and well-nourished. She is not diaphoretic. No distress.   HENT:   Head: Normocephalic.   Right Ear: External ear normal.   Left Ear: External ear normal.   Mouth/Throat: Oropharynx is clear and moist.   Eyes: Conjunctivae and EOM are normal. Pupils are equal, round, and reactive to light.   Neck: Normal range of motion. Neck supple. No JVD present.   Cardiovascular: Normal rate, regular rhythm and normal heart sounds.   Pulmonary/Chest: Breath sounds normal. No respiratory distress. She has no wheezes.   Abdominal: Soft. Bowel sounds are normal. She exhibits no distension. There is no tenderness. There is no rebound and no guarding.   Mild tenderness palpation the right upper and left upper quadrant   Musculoskeletal: Normal range of motion. She exhibits no edema or tenderness.   Neurological: She is alert and oriented to person, place, and time. She has normal strength. No cranial nerve deficit or sensory deficit.   Skin: Skin is warm and dry. Capillary refill takes less than 2 seconds. No rash noted.   Psychiatric: She has a normal mood and affect. Thought content normal.         ED Course   Procedures  Labs Reviewed   CBC W/ AUTO DIFFERENTIAL - Abnormal; Notable for the following components:       Result Value    WBC 18.92 (*)     Mean Corpuscular Hemoglobin 31.3 (*)     Platelets 465 (*)     MPV 8.3 (*)     Gran # (ANC) 16.6 (*)     Lymph # 0.9 (*)     Gran% 87.9 (*)     Lymph% 4.8 (*)     All other components within normal  limits   COMPREHENSIVE METABOLIC PANEL - Abnormal; Notable for the following components:    CO2 21 (*)     Total Bilirubin 1.4 (*)     All other components within normal limits   URINALYSIS, REFLEX TO URINE CULTURE - Abnormal; Notable for the following components:    Protein, UA Trace (*)     Ketones, UA Trace (*)     All other components within normal limits    Narrative:     Preferred Collection Type->Urine, Clean Catch   LIPASE   PREGNANCY TEST, URINE RAPID   DRUG SCREEN PANEL, URINE EMERGENCY    Narrative:     Preferred Collection Type->Urine, Clean Catch         Results for orders placed or performed during the hospital encounter of 07/04/19   CBC W/ AUTO DIFFERENTIAL   Result Value Ref Range    WBC 18.92 (H) 3.90 - 12.70 K/uL    RBC 5.02 4.00 - 5.40 M/uL    Hemoglobin 15.7 12.0 - 16.0 g/dL    Hematocrit 45.9 37.0 - 48.5 %    Mean Corpuscular Volume 91 82 - 98 fL    Mean Corpuscular Hemoglobin 31.3 (H) 27.0 - 31.0 pg    Mean Corpuscular Hemoglobin Conc 34.2 32.0 - 36.0 g/dL    RDW 13.7 11.5 - 14.5 %    Platelets 465 (H) 150 - 350 K/uL    MPV 8.3 (L) 9.2 - 12.9 fL    Gran # (ANC) 16.6 (H) 1.8 - 7.7 K/uL    Lymph # 0.9 (L) 1.0 - 4.8 K/uL    Mono # 0.9 0.3 - 1.0 K/uL    Eos # 0.4 0.0 - 0.5 K/uL    Baso # 0.04 0.00 - 0.20 K/uL    Gran% 87.9 (H) 38.0 - 73.0 %    Lymph% 4.8 (L) 18.0 - 48.0 %    Mono% 4.8 4.0 - 15.0 %    Eosinophil% 2.3 0.0 - 8.0 %    Basophil% 0.2 0.0 - 1.9 %    Differential Method Automated    Comp. Metabolic Panel   Result Value Ref Range    Sodium 138 136 - 145 mmol/L    Potassium 4.1 3.5 - 5.1 mmol/L    Chloride 104 95 - 110 mmol/L    CO2 21 (L) 23 - 29 mmol/L    Glucose 91 70 - 110 mg/dL    BUN, Bld 14 6 - 20 mg/dL    Creatinine 0.8 0.5 - 1.4 mg/dL    Calcium 9.2 8.7 - 10.5 mg/dL    Total Protein 8.2 6.0 - 8.4 g/dL    Albumin 4.6 3.5 - 5.2 g/dL    Total Bilirubin 1.4 (H) 0.1 - 1.0 mg/dL    Alkaline Phosphatase 68 55 - 135 U/L    AST 21 10 - 40 U/L    ALT 16 10 - 44 U/L    Anion Gap 13 8 - 16  mmol/L    eGFR if African American >60.0 >60 mL/min/1.73 m^2    eGFR if non African American >60.0 >60 mL/min/1.73 m^2   Lipase   Result Value Ref Range    Lipase 15 4 - 60 U/L   Urinalysis, Reflex to Urine Culture Urine, Clean Catch   Result Value Ref Range    Specimen UA Urine, Clean Catch     Color, UA Yellow Yellow, Straw, Jocelynn    Appearance, UA Clear Clear    pH, UA 5.0 5.0 - 8.0    Specific Gravity, UA 1.025 1.005 - 1.030    Protein, UA Trace (A) Negative    Glucose, UA Negative Negative    Ketones, UA Trace (A) Negative    Bilirubin (UA) Negative Negative    Occult Blood UA Negative Negative    Nitrite, UA Negative Negative    Urobilinogen, UA Negative <2.0 EU/dL    Leukocytes, UA Negative Negative   Pregnancy, urine rapid   Result Value Ref Range    Preg Test, Ur Negative    Drug screen panel, emergency   Result Value Ref Range    Benzodiazepines Negative     Methadone metabolites Negative     Cocaine (Metab.) Negative     Opiate Scrn, Ur Negative     Barbiturate Screen, Ur Negative     Amphetamine Screen, Ur Presumptive Positive     THC Negative     Phencyclidine Negative     Creatinine, Random Ur 278.4 15.0 - 325.0 mg/dL    Toxicology Information SEE COMMENT          Imaging Results          CT Abdomen Pelvis With Contrast (Preliminary result)  Result time 07/05/19 01:52:26    ED Interpretation by Brandy Ridley MD (07/05/19 01:52:26, [facility], Emergency Medicine)    Virtual Radiology interpretation:  1.  Scattered air-fluid levels within nonobstructed small bowel loops in the pelvis and small amount of fluid in proximal colon.  Findings are nonspecific although may be related to mild enteritis and diarrheal disease.  Correlate clinically.  2.  No bowel obstruction, significant bowel wall thickening, free fluid or free air.  3.  No evidence of acute appendicitis.  4.  Intrauterine device in the uterus.  Positioning appears satisfactory.                                          Review  of data obtained from the [place] Prescription Monitoring [employer] reveals that the patient has received the following medications:    Summary    Summary   Total Prescriptions: 13   Total Prescribers: 7   Total Pharmacies: 6   Narcotics*   (excluding buprenorphine)  Current Qty: 0   Current MME/day: 0.00   30 Day Avg MME/day: 0.00   Sedatives*   Current Qty: 0   Current LME/day: 0.00   30 Day Avg LME/day: 0.00   Buprenorphine*   Current Qty: 0   Current mg/day: 0.00   30 Day Avg mg/day: 10.67   Rx Data   Total Prescriptions: 13       PRESCRIPTIONS  Total Prescriptions: 13   Total Private Pay: 0   Fill Date ID Written Drug Qty Days Prescriber Rx # Pharmacy Refill Daily Dose * Pymt Type    05/22/2019  4   05/21/2019  Suboxone 8 Mg-2 MG SL Film  75 30 Ra Wor  [id number]   [facility] ([id number][facility]  0  20.00 MG  [place]   05/21/2019  4   05/21/2019  Suboxone 8 Mg-2 MG SL Film  15 5 Ra Wor  [id number]   [facility] ([id number])  0  24.00 [place]   04/16/2019  4   03/19/2019  Buprenorphine 8 MG Tablet SL  90 30 Ra Wor  [id number]   [facility] ([id number])  1  24.00 MG  [place]   03/19/2019  4   03/19/2019  Buprenorphine 8 MG Tablet SL  90 30 Ra Wor  [id number]   [facility] ([id number])  0  24.00 MG  [place]   02/26/2019  3   12/31/2018  Buprenorphine 8 MG Tablet SL  60 30 Ra Wor  [id number]   [facility] ([id number])  1  16.00 MG  [place]   01/28/2019  3   12/31/2018  Buprenorphine 8 MG Tablet SL  60 30 Ra Wor  [id number]   [facility] ([id number])  0  16.00 MG  [place]   12/31/2018  2   12/31/2018  Buprenorphine 8 MG Tablet SL  60 30 Ra Wor  [id number]   [facility] ([id number])  0  16.00 MG  [place]   03/09/2018  1   03/09/2018  Hydrocodone-Acetamin 5-325 MG  12 2 Ed Tra  [id number]   [facility] ([id number])  0  30.00 MME  [place]   01/29/2018  1   01/29/2018  Hydrocodone-Acetamin 5-325 MG  18 3 Je Medhat  [id number]   [facility] ([id number])  0  30.00 MME  [place]   09/24/2017  5   09/24/2017  Hydrocodone-Acetamin 5-325 MG  10 2 [address]  [id number]   [facility] ([id number])  0  25.00 MME  [place] 08/22/2017 1 08/22/2017   Hydrocodone-Acetamin  MG  12 2 Ja Odo  [id number]   [facility] ([id number])  0  60.00 MME  [place]   08/22/2017  1   08/22/2017  Hydrocodone-Acetamin  MG  4 1 Ja Odo  [id number]   [facility] ([id number])  1  40.00 MME  Other  [place]   08/16/2017  1   08/16/2017  Hydrocodone-Acetamin  MG  16 3 Ja Odo  [id number]   [facility] ([id number])  0  53.33 MME  [place]   *Per CDC guidance, the MME conversion factors prescribed or provided as part of the medication-assisted treatment for opioid use disorder should not be used to benchmark against dosage thresholds meant for opioids prescribed for pain. Buprenorphine products have no agreed upon morphine equivalency, and as partial opioid agonists, are not expected to be associated with overdose risk in the same dose-dependent manner as doses for full agonist opioids. MME = morphine milligram equivalents. LME = Lorazepam milligram equivalents. MG = dose in milligrams.   PROVIDERS  Total Providers: 7  Name Address [facility] Zipcode Phone   Anthony Sales Jr, , Dds [address] [facility] [address]    Yoandy Arambula, Dds [address] [place] [address]    Darius Ruby Iv, Dds [address] [facility] [address]    Reji Alegre [address] [place] [address]    Reji Alegre MD [address] [place] [address]    Reji Alegre MD [address] [facility] [place] [address]    [facility] [address] [facility] [place] [address]      Medications   sodium chloride 0.9% bolus 1,000 mL (0 mLs Intravenous Stopped 7/5/19 0027)   promethazine (PHENERGAN) 12.5 mg in dextrose 5 % 50 mL IVPB (0 mg Intravenous Stopped 7/5/19 0005)   iohexol (OMNIPAQUE 350) injection 75 mL (75 mLs Intravenous Given 7/5/19 0113)             ED Course as of Jul 05 0201   Fri Jul 05, 2019   0059 I have reviewed patient's [place]  which shows that patient had been prescribed Suboxone for the past 2 months.  Patient also states that she has been weaning herself off of Suboxone and  takes Adderall.  However upon my evaluation of patient's chart, patient has not been prescribed Adderall.  Patient also states that she has been on Suboxone for the past 4-5 months, according to records patient has been prescribed Suboxone for the past 2 months only.  There is noted discrepancy did between patient's history of medications versus actual [place] documentation.    [DANYELLE]   0152 Re-evaluation:  Patient reports mild improvement of symptoms.  I have discussed her results.  Patient has gastroenteritis.  Will treat her symptomatically and I have discussed red flag signs of abdominal pain including worsening pain, fevers chills or non intractable intractable nausea vomiting.  Patient and patient's family at bedside verbalized understanding.  All questions were answered appropriately.  Patient is hemodynamically stable for discharge.    [DANYELLE]      ED Course User Index  [DANYELLE] Brandy Ridley MD       Discussed the signs and symptoms of gastroenteritis with patient including: abdominal pain; nausea, vomiting, and diarrhea; chills; clammy skin; excessive sweating; fever; joint stiffness; fecal incontinence; muscle pain; and weight loss. Advised patient to drink water or sports beverages such as Gatorade for electrolyte replacement; do NOT use fruit juice (including apple juice), sodas or cola (flat or bubbly), Jell-O, or broth due to high sugar content; drink small amounts of fluid (2-4 oz.) every 30-60 minutes; and eat small amounts of food, when tolerable such as cereals, bread, potatoes, apples, bananas, and vegetables.  I also instructed on disease transmission and need for frequent hand washing.    I discussed with patient that the evaluation in the emergency department does not suggest any emergent or life threatening medical condition requiring immediate intervention beyond what was provided in the ED, and I believe patient is safe for discharge.  Regardless, an unremarkable evaluation in the ED does  "not preclude the development or presence of a serious of life threatening condition. As such, patient was instructed to return immediately for any worsening or change in current symptoms. I also discussed the results of my evaluation and diagnosis with patient and she concurs with the evaluation and management plan.  Detailed written and verbal instructions provided to patient and she expressed a verbal understanding of the discharge instructions and overall management plan. Reiterated the importance of medication administration and safety and advised patient to follow up with primary care provider in 3-5 days or sooner if needed.  Also advised patient to return to the ER for any complications.       Clinical Impression:       ICD-10-CM ICD-9-CM   1. Gastroenteritis K52.9 558.9     Current Discharge Medication List      START taking these medications    Details   dicyclomine (BENTYL) 20 mg tablet Take 1 tablet (20 mg total) by mouth 2 (two) times daily. for 10 days  Qty: 20 tablet, Refills: 0      ondansetron (ZOFRAN) 4 MG tablet Take 1 tablet (4 mg total) by mouth every 6 (six) hours. for 5 days  Qty: 20 tablet, Refills: 0      promethazine (PHENERGAN) 25 MG tablet Take 0.5 tablets (12.5 mg total) by mouth every 6 (six) hours as needed for Nausea.  Qty: 8 tablet, Refills: 0               Disposition:   Disposition: Discharged  Condition: Stable         Portions of this note may have been created with voice recognition software. Occasional "wrong-word" or "sound-a-like" substitutions may have occurred due to the inherent limitations of voice recognition software. Please, read the note carefully and recognize, using context, where substitutions have occurred.                    Brandy Ridley MD  07/05/19 0159       Brandy Ridley MD  07/05/19 0201       Brandy Ridley MD  07/05/19 0202    "

## 2019-07-05 NOTE — DISCHARGE INSTRUCTIONS
Discussed the signs and symptoms of gastroenteritis with patient including: abdominal pain; nausea, vomiting, and diarrhea; chills; clammy skin; excessive sweating; fever; joint stiffness; fecal incontinence; muscle pain; and weight loss. Advised patient to drink water or sports beverages such as Gatorade for electrolyte replacement; do NOT use fruit juice (including apple juice), sodas or cola (flat or bubbly), Jell-O, or broth due to high sugar content; drink small amounts of fluid (2-4 oz.) every 30-60 minutes; and eat small amounts of food, when tolerable such as cereals, bread, potatoes, apples, bananas, and vegetables.  I also instructed on disease transmission and need for frequent hand washing.    Discussed the signs and symptoms of gastroenteritis with patient's guardian including: abdominal pain; nausea, vomiting, and diarrhea; chills; clammy skin; excessive sweating; fever; joint stiffness; fecal incontinence; muscle pain; and weight loss. Advised patient's guardian to have patient drink water or sports beverages such as Gatorade for electrolyte replacement; do NOT use fruit juice (including apple juice), sodas or cola (flat or bubbly), Jell-O, or broth due to high sugar content; drink small amounts of fluid (2-4 oz.) every 30-60 minutes; and eat small amounts of food, when tolerable such as cereals, bread, potatoes, apples, bananas, and vegetables.  I also instructed on disease transmission and need for frequent hand washing.

## 2019-11-27 ENCOUNTER — HOSPITAL ENCOUNTER (EMERGENCY)
Facility: HOSPITAL | Age: 29
Discharge: HOME | End: 2019-11-27
Attending: EMERGENCY MEDICINE
Payer: MEDICAID

## 2019-11-27 VITALS
SYSTOLIC BLOOD PRESSURE: 127 MMHG | DIASTOLIC BLOOD PRESSURE: 72 MMHG | BODY MASS INDEX: 23.2 KG/M2 | TEMPERATURE: 98 F | RESPIRATION RATE: 19 BRPM | WEIGHT: 143.75 LBS | OXYGEN SATURATION: 99 % | HEART RATE: 99 BPM

## 2019-11-27 DIAGNOSIS — J20.9 ACUTE BRONCHITIS, UNSPECIFIED ORGANISM: Primary | ICD-10-CM

## 2019-11-27 DIAGNOSIS — R05.9 COUGH: ICD-10-CM

## 2019-11-27 PROCEDURE — 99284 EMERGENCY DEPT VISIT MOD MDM: CPT | Mod: 25,ER

## 2019-11-27 RX ORDER — ONDANSETRON 4 MG/1
4 TABLET, ORALLY DISINTEGRATING ORAL EVERY 8 HOURS PRN
Qty: 12 TABLET | Refills: 0 | Status: SHIPPED | OUTPATIENT
Start: 2019-11-27

## 2019-11-27 RX ORDER — PROMETHAZINE HYDROCHLORIDE AND DEXTROMETHORPHAN HYDROBROMIDE 6.25; 15 MG/5ML; MG/5ML
5 SYRUP ORAL
Qty: 180 ML | Refills: 0 | Status: SHIPPED | OUTPATIENT
Start: 2019-11-27 | End: 2019-12-07

## 2019-11-27 RX ORDER — GUAIFENESIN 600 MG/1
1200 TABLET, EXTENDED RELEASE ORAL 2 TIMES DAILY
Status: ON HOLD | COMMUNITY
Start: 2019-11-27 | End: 2020-06-26 | Stop reason: HOSPADM

## 2019-11-27 RX ORDER — PREDNISONE 20 MG/1
TABLET ORAL
Qty: 18 TABLET | Refills: 0 | Status: ON HOLD | OUTPATIENT
Start: 2019-11-27 | End: 2020-06-26 | Stop reason: HOSPADM

## 2019-11-27 RX ORDER — BUPRENORPHINE HYDROCHLORIDE AND NALOXONE HYDROCHLORIDE DIHYDRATE 2; .5 MG/1; MG/1
TABLET SUBLINGUAL EVERY 6 HOURS PRN
COMMUNITY

## 2019-11-27 RX ORDER — CLONIDINE HYDROCHLORIDE 0.1 MG/1
0.1 TABLET ORAL 2 TIMES DAILY
COMMUNITY

## 2019-11-27 NOTE — ED PROVIDER NOTES
Encounter Date: 11/27/2019       History     Chief Complaint   Patient presents with    Cough     cough, vomiting for 2 weeks.      Patient is a 29-year-old female who presents today with complaints of cough x2 weeks.  She presents with 2 children and her significant other.  They are all sick with similar symptoms. Patient states that she has also had some vomiting, chills, and sore throat. Her worst symptom by far is her coughing.  She has tried over-the-counter antitussives without relief.  She has had no prior physician evaluation.  Denies chest pain, shortness of breath, fever/chills, abdominal pain, and all other symptoms. Denies pregnancy.  She is only sexually active with females.        Review of patient's allergies indicates:  No Known Allergies  Past Medical History:   Diagnosis Date    Bipolar 1 disorder     Borderline personality disorder     Depression     Gastric ulcer     GERD (gastroesophageal reflux disease)     Hx of ovarian cyst      Past Surgical History:   Procedure Laterality Date    NO PAST SURGERIES       History reviewed. No pertinent family history.  Social History     Tobacco Use    Smoking status: Current Every Day Smoker     Packs/day: 0.50     Types: Cigarettes    Smokeless tobacco: Never Used   Substance Use Topics    Alcohol use: No    Drug use: No     Review of Systems   Constitutional: Negative for chills, diaphoresis and fever.   HENT: Positive for sore throat. Negative for congestion and rhinorrhea.    Eyes: Negative for pain, redness and visual disturbance.   Respiratory: Positive for cough. Negative for shortness of breath.    Cardiovascular: Negative for chest pain, palpitations and leg swelling.   Gastrointestinal: Positive for diarrhea and nausea. Negative for abdominal distention, abdominal pain, blood in stool, constipation and vomiting.   Genitourinary: Negative for dysuria and hematuria.   Musculoskeletal: Negative for arthralgias and joint swelling.   Skin:  Negative for rash and wound.   Neurological: Negative for seizures, syncope and headaches.   All other systems reviewed and are negative.      Physical Exam     Initial Vitals [11/27/19 1633]   BP Pulse Resp Temp SpO2   125/75 (!) 112 20 98.1 °F (36.7 °C) 99 %      MAP       --         Physical Exam    Nursing note and vitals reviewed.  Constitutional: She appears well-developed and well-nourished. No distress.   HENT:   Head: Normocephalic and atraumatic.   Mouth/Throat: Oropharynx is clear and moist.   Eyes: Conjunctivae and EOM are normal. Pupils are equal, round, and reactive to light.   Neck: Neck supple. No tracheal deviation present.   Cardiovascular: Normal rate, regular rhythm, normal heart sounds and intact distal pulses.   Pulmonary/Chest: Breath sounds normal. No respiratory distress. She has no wheezes. She has no rales.   Incessant coughing; no stridor   Abdominal: Soft. She exhibits no distension. There is no tenderness. There is no rebound and no guarding.   Musculoskeletal: Normal range of motion. She exhibits no edema or tenderness.   Neurological: She is alert and oriented to person, place, and time. GCS score is 15. GCS eye subscore is 4. GCS verbal subscore is 5. GCS motor subscore is 6.   No focal deficits   Skin: Skin is warm. No rash noted. No erythema.   Psychiatric: She has a normal mood and affect. Her behavior is normal.         ED Course   Procedures  Labs Reviewed - No data to display       Imaging Results          X-Ray Chest PA And Lateral (Final result)  Result time 11/27/19 17:35:27    Final result by Tomasz Sharma MD (11/27/19 17:35:27)                 Impression:      Negative chest x-ray.      Electronically signed by: Tomasz Sharma MD  Date:    11/27/2019  Time:    17:35             Narrative:    EXAMINATION:  XR CHEST PA AND LATERAL    CLINICAL HISTORY:  Cough    FINDINGS:  Comparison study 07/24/2017.  No change.  Normal size heart.  No congestion.  Lungs are clear.                                  Medication List      START taking these medications    guaiFENesin 600 mg 12 hr tablet  Commonly known as:  Mucinex  Take 2 tablets (1,200 mg total) by mouth 2 (two) times daily.     ondansetron 4 MG Tbdl  Commonly known as:  ZOFRAN-ODT  Take 1 tablet (4 mg total) by mouth every 8 (eight) hours as needed (nausea/vomiting).     predniSONE 20 MG tablet  Commonly known as:  DELTASONE  Take 1 tablet 3 times per day for 3 days, then Take 1 tablet 2 times per day for 3 days, then Take 1 tablet daily for 3 days     promethazine-dextromethorphan 6.25-15 mg/5 mL Syrp  Commonly known as:  PROMETHAZINE-DM  Take 5 mLs by mouth every 6 to 8 hours as needed (Cough).        CONTINUE taking these medications    buprenorphine-naloxone 2-0.5 mg 2-0.5 mg Subl  Commonly known as:  SUBOXONE     cloNIDine 0.1 MG tablet  Commonly known as:  CATAPRES     dextroamphetamine-amphetamine 10 MG 24 hr capsule  Commonly known as:  ADDERALL XR     FLUoxetine 20 MG capsule           Where to Get Your Medications      These medications were sent to [facility] - [address] [address]    Phone:  [phone]   · predniSONE 20 MG tablet  · promethazine-dextromethorphan 6.25-15 mg/5 mL Syrp     You can get these medications from any pharmacy    Bring a paper prescription for each of these medications  · ondansetron 4 MG Tbdl  You don't need a prescription for these medications  · guaiFENesin 600 mg 12 hr tablet                                            Clinical Impression:   Final diagnoses:  [R05] Cough  [J20.9] Acute bronchitis, unspecified organism (Primary)      Disposition:   Disposition: Discharged  Condition: Stable                     Jay Oden MD  11/27/19 2178

## 2020-03-09 ENCOUNTER — HOSPITAL ENCOUNTER (EMERGENCY)
Facility: HOSPITAL | Age: 30
Discharge: HOME | End: 2020-03-09
Attending: EMERGENCY MEDICINE
Payer: MEDICAID

## 2020-03-09 VITALS
OXYGEN SATURATION: 100 % | BODY MASS INDEX: 21.77 KG/M2 | HEIGHT: 67 IN | RESPIRATION RATE: 19 BRPM | TEMPERATURE: 99 F | DIASTOLIC BLOOD PRESSURE: 87 MMHG | WEIGHT: 138.69 LBS | HEART RATE: 99 BPM | SYSTOLIC BLOOD PRESSURE: 140 MMHG

## 2020-03-09 DIAGNOSIS — J06.9 VIRAL URI WITH COUGH: Primary | ICD-10-CM

## 2020-03-09 DIAGNOSIS — J02.9 PHARYNGITIS, UNSPECIFIED ETIOLOGY: ICD-10-CM

## 2020-03-09 LAB
GROUP A STREP, MOLECULAR: NEGATIVE
INFLUENZA A, MOLECULAR: NEGATIVE
INFLUENZA B, MOLECULAR: NEGATIVE
SPECIMEN SOURCE: NORMAL

## 2020-03-09 PROCEDURE — 87651 STREP A DNA AMP PROBE: CPT | Mod: ER

## 2020-03-09 PROCEDURE — 99284 EMERGENCY DEPT VISIT MOD MDM: CPT | Mod: ER

## 2020-03-09 PROCEDURE — 87502 INFLUENZA DNA AMP PROBE: CPT | Mod: ER

## 2020-03-09 RX ORDER — ONDANSETRON 4 MG/1
4 TABLET, ORALLY DISINTEGRATING ORAL EVERY 6 HOURS PRN
Qty: 10 TABLET | Refills: 0 | Status: ON HOLD | OUTPATIENT
Start: 2020-03-09 | End: 2020-06-26 | Stop reason: HOSPADM

## 2020-03-09 RX ORDER — NAPROXEN 500 MG/1
500 TABLET ORAL 2 TIMES DAILY WITH MEALS
Qty: 12 TABLET | Refills: 0 | Status: SHIPPED | OUTPATIENT
Start: 2020-03-09

## 2020-03-09 NOTE — ED NOTES
Pt complains of sore throat, nasal drainage, cough. Reports symptoms started during the night - patient reports no symptoms when going to bed last night, woke up during the night with difficulty breathing secondary to nasal congestion, this morning noted sore/scratchy throat (primarily on left side), denies nausea but states she would throw up within minutes of eating today, able to keep apple sauce down this afternoon. Symptoms are modified by nothing - made worse by eating. Pt describes pain as scratchy. Previous treatments includes none. Pt denies nausea, recent illness/injury - does report a childhood history of recurrent strep.    Level of Consciousness: The patient is awake, alert, and oriented to person, place, time, and event. Pts affect is appropriate, speech and interaction are appropriate.   Appearance: Pt is resting comfortably on stretcher, no acute distress is noted. Clothing and hygiene are appropriate.   Skin: Skin is grossly intact, PWD, with normal skin turgor. Mucous membranes are moist. Skin color normal.   Musculoskeletal: Moves all extremities well, full range of motion, no obvious deformities noted. Pt ambulates independently and without abnormality.   Respiratory: Airway open and patent. Respiration rate even and unlabored. No use of accessory muscles noted.  Cardiac: Regular rate and rhythm. No peripheral edema noted. Peripheral pulses palpated. Capillary refill brisk.   HEENT: atraumatic, normocephalic  Abdomen: No distension noted.  Neurologic: Symmetrical expression noted in face. Hand grasps equal. Normal sensation reported in all extremities. No obvious neurological deficits noted.   Psychosocial: Wife and children in lobby, no family/friend at bedside.     Pt made aware of plan of care, verbalizes understanding and denies any questions at this time. Bed low and locked. Call light in reach. Will continue to monitor patient.

## 2020-03-09 NOTE — ED PROVIDER NOTES
History      Chief Complaint   Patient presents with    Vomiting    Nasal Congestion     since last night    Sore Throat       Review of patient's allergies indicates:  No Known Allergies     HPI   HPI    3/9/2020, 6:10 PM   History obtained from the patient      History of Present Illness: Alessandra yS is a 29 y.o. female patient who presents to the Emergency Department for sore throat, cough, nasal congestion for 1 day.  Symptoms are constant and moderate in severity.   No further complaints or concerns at this time.           PCP: ABEL Diggs       Past Medical History:  Past Medical History:   Diagnosis Date    Bipolar 1 disorder     Borderline personality disorder     Depression     Gastric ulcer     GERD (gastroesophageal reflux disease)     Hx of ovarian cyst          Past Surgical History:  Past Surgical History:   Procedure Laterality Date    NO PAST SURGERIES             Family History:  History reviewed. No pertinent family history.        Social History:  Social History     Tobacco Use    Smoking status: Current Every Day Smoker     Packs/day: 0.50     Types: Cigarettes    Smokeless tobacco: Never Used   Substance and Sexual Activity    Alcohol use: No    Drug use: No    Sexual activity: Yes     Partners: Female     Birth control/protection: None       ROS   Review of Systems   Constitutional: Positive for chills and fever. Negative for appetite change.   HENT: Negative for mouth sores and trouble swallowing.    Respiratory: Positive for cough. Negative for shortness of breath.    Cardiovascular: Negative for chest pain.   Gastrointestinal: Negative for abdominal pain and vomiting.   Genitourinary: Negative for dysuria and flank pain.   Musculoskeletal: Negative for back pain, neck pain and neck stiffness.   Skin: Negative for pallor and rash.   Neurological: Negative for syncope and weakness.   Hematological: Does not bruise/bleed easily.   All other systems reviewed  "and are negative.      Review of Systems    Physical Exam        Initial Vitals [03/09/20 1750]   BP Pulse Resp Temp SpO2   (!) 140/87 99 19 98.6 °F (37 °C) 100 %      MAP       --         Physical Exam  Vital signs and nursing notes reviewed.  Constitutional: Patient is in NAD. Awake and alert. Well-developed and well-nourished.  Head: Atraumatic. Normocephalic.  Eyes: PERRL. EOM intact. Conjunctivae nl. No scleral icterus.  ENT: Mucous membranes are moist. Oropharynx is mildly erythematous, no exudates.  Nasal congestion.  TMs clear bilaterally.  No mastoid ttp  Neck: Supple. No JVD. No lymphadenopathy.  No meningismus  Cardiovascular: Regular rate and rhythm. No murmurs, rubs, or gallops. Distal pulses are 2+ and symmetric.  Pulmonary/Chest: No respiratory distress. Clear to auscultation bilaterally. No wheezing, rales, or rhonchi.  Abdominal: Soft. Non-distended. No TTP. No rebound, guarding, or rigidity. Good bowel sounds.  Genitourinary: No CVA tenderness  Musculoskeletal: Moves all extremities. No edema.   Skin: Warm and dry.  No rash  Neurological: Awake and alert. No acute focal neurological deficits are appreciated.  Psychiatric: Normal affect. Good eye contact. Appropriate in content.      ED Course      Procedures  ED Vital Signs:  Vitals:    03/09/20 1750   BP: (!) 140/87   Pulse: 99   Resp: 19   Temp: 98.6 °F (37 °C)   TempSrc: Oral   SpO2: 100%   Weight: 62.9 kg (138 lb 10.7 oz)   Height: 5' 6.5" (1.689 m)       Results for orders placed or performed during the hospital encounter of 03/09/20   Influenza A & B by Molecular   Result Value Ref Range    Influenza A, Molecular Negative Negative    Influenza B, Molecular Negative Negative    Flu A & B Source NP    Group A Strep, Molecular   Result Value Ref Range    Group A Strep, Molecular Negative Negative             Imaging Results:  Imaging Results    None            The Emergency Provider reviewed the vital signs and test results, which are outlined " above.    ED Discussion         All findings were reviewed with the patient/family in detail.   All remaining questions and concerns were addressed at that time.  Patient/family has been counseled regarding the need for follow-up as well as the indication to return to the emergency room should new or worrisome developments occur.        Medication(s) given in the ER:  Medications - No data to display        Follow-up Information     ABEL Diggs In 2 days.    Specialty:  Family Medicine  Contact information:  [address]   [place] [place] [id number]  [phone]                          Medication List      START taking these medications    naproxen 500 MG tablet  Commonly known as:  NAPROSYN  Take 1 tablet (500 mg total) by mouth 2 (two) times daily with meals. Prn pain        CHANGE how you take these medications    * ondansetron 4 MG Tbdl  Commonly known as:  ZOFRAN-ODT  Take 1 tablet (4 mg total) by mouth every 8 (eight) hours as needed (nausea/vomiting).  What changed:  Another medication with the same name was added. Make sure you understand how and when to take each.     * ondansetron 4 MG Tbdl  Commonly known as:  ZOFRAN-ODT  Take 1 tablet (4 mg total) by mouth every 6 (six) hours as needed (nausea/vomiting).  What changed:  You were already taking a medication with the same name, and this prescription was added. Make sure you understand how and when to take each.         * This list has 2 medication(s) that are the same as other medications prescribed for you. Read the directions carefully, and ask your doctor or other care provider to review them with you.            ASK your doctor about these medications    buprenorphine-naloxone 2-0.5 mg 2-0.5 mg Subl  Commonly known as:  SUBOXONE     cloNIDine 0.1 MG tablet  Commonly known as:  CATAPRES     dextroamphetamine-amphetamine 10 MG 24 hr capsule  Commonly known as:  ADDERALL XR     FLUoxetine 20 MG capsule     guaiFENesin 600 mg 12 hr tablet  Commonly known  as:  Mucinex  Take 2 tablets (1,200 mg total) by mouth 2 (two) times daily.     predniSONE 20 MG tablet  Commonly known as:  DELTASONE  Take 1 tablet 3 times per day for 3 days, then Take 1 tablet 2 times per day for 3 days, then Take 1 tablet daily for 3 days           Where to Get Your Medications      You can get these medications from any pharmacy    Bring a paper prescription for each of these medications  · naproxen 500 MG tablet  · ondansetron 4 MG Tbdl             Medical Decision Making        MDM               Clinical Impression:        ICD-10-CM ICD-9-CM   1. Viral URI with cough J06.9 465.9    B97.89    2. Pharyngitis, unspecified etiology J02.9 462             Danae Jones PA-C  03/09/20 2011

## 2020-03-10 NOTE — ED NOTES
Patient examined, evaluated, and educated on discharge prescriptions and instructions by siobhan sevilla PA-C. Patient discharged to [facility] by siobhan sevilla PA-C.

## 2020-06-22 ENCOUNTER — HOSPITAL ENCOUNTER (INPATIENT)
Facility: HOSPITAL | Age: 30
LOS: 4 days | Discharge: HOME | DRG: 759 | End: 2020-06-26
Attending: EMERGENCY MEDICINE | Admitting: OBSTETRICS & GYNECOLOGY
Payer: MEDICAID

## 2020-06-22 DIAGNOSIS — N70.93 TUBO-OVARIAN ABSCESS: ICD-10-CM

## 2020-06-22 LAB
ALBUMIN SERPL BCP-MCNC: 3.9 G/DL (ref 3.5–5.2)
ALP SERPL-CCNC: 54 U/L (ref 55–135)
ALT SERPL W/O P-5'-P-CCNC: 12 U/L (ref 10–44)
ANION GAP SERPL CALC-SCNC: 10 MMOL/L (ref 8–16)
AST SERPL-CCNC: 18 U/L (ref 10–40)
B-HCG UR QL: NEGATIVE
BASOPHILS # BLD AUTO: 0.05 K/UL (ref 0–0.2)
BASOPHILS NFR BLD: 0.3 % (ref 0–1.9)
BILIRUB SERPL-MCNC: 1 MG/DL (ref 0.1–1)
BILIRUB UR QL STRIP: NEGATIVE
BUN SERPL-MCNC: 8 MG/DL (ref 6–20)
CALCIUM SERPL-MCNC: 9.2 MG/DL (ref 8.7–10.5)
CHLORIDE SERPL-SCNC: 101 MMOL/L (ref 95–110)
CLARITY UR REFRACT.AUTO: CLEAR
CO2 SERPL-SCNC: 27 MMOL/L (ref 23–29)
COLOR UR AUTO: ABNORMAL
CREAT SERPL-MCNC: 0.7 MG/DL (ref 0.5–1.4)
DIFFERENTIAL METHOD: ABNORMAL
EOSINOPHIL # BLD AUTO: 0.2 K/UL (ref 0–0.5)
EOSINOPHIL NFR BLD: 1 % (ref 0–8)
ERYTHROCYTE [DISTWIDTH] IN BLOOD BY AUTOMATED COUNT: 13.3 % (ref 11.5–14.5)
EST. GFR  (AFRICAN AMERICAN): >60 ML/MIN/1.73 M^2
EST. GFR  (NON AFRICAN AMERICAN): >60 ML/MIN/1.73 M^2
GLUCOSE SERPL-MCNC: 76 MG/DL (ref 70–110)
GLUCOSE UR QL STRIP: NEGATIVE
HCT VFR BLD AUTO: 40.2 % (ref 37–48.5)
HGB BLD-MCNC: 13.5 G/DL (ref 12–16)
HGB UR QL STRIP: ABNORMAL
IMM GRANULOCYTES # BLD AUTO: 0.06 K/UL (ref 0–0.04)
IMM GRANULOCYTES NFR BLD AUTO: 0.3 % (ref 0–0.5)
KETONES UR QL STRIP: ABNORMAL
LEUKOCYTE ESTERASE UR QL STRIP: NEGATIVE
LIPASE SERPL-CCNC: 16 U/L (ref 4–60)
LYMPHOCYTES # BLD AUTO: 2.5 K/UL (ref 1–4.8)
LYMPHOCYTES NFR BLD: 12.8 % (ref 18–48)
MCH RBC QN AUTO: 31.6 PG (ref 27–31)
MCHC RBC AUTO-ENTMCNC: 33.6 G/DL (ref 32–36)
MCV RBC AUTO: 94 FL (ref 82–98)
MONOCYTES # BLD AUTO: 1.5 K/UL (ref 0.3–1)
MONOCYTES NFR BLD: 7.9 % (ref 4–15)
NEUTROPHILS # BLD AUTO: 15.2 K/UL (ref 1.8–7.7)
NEUTROPHILS NFR BLD: 77.7 % (ref 38–73)
NITRITE UR QL STRIP: NEGATIVE
NRBC BLD-RTO: 0 /100 WBC
PH UR STRIP: 7 [PH] (ref 5–8)
PLATELET # BLD AUTO: 420 K/UL (ref 150–350)
PMV BLD AUTO: 8.4 FL (ref 9.2–12.9)
POTASSIUM SERPL-SCNC: 3.5 MMOL/L (ref 3.5–5.1)
PROT SERPL-MCNC: 7.7 G/DL (ref 6–8.4)
PROT UR QL STRIP: NEGATIVE
RBC # BLD AUTO: 4.27 M/UL (ref 4–5.4)
SODIUM SERPL-SCNC: 138 MMOL/L (ref 136–145)
SP GR UR STRIP: 1.02 (ref 1–1.03)
URN SPEC COLLECT METH UR: ABNORMAL
UROBILINOGEN UR STRIP-ACNC: <2 EU/DL
WBC # BLD AUTO: 19.49 K/UL (ref 3.9–12.7)

## 2020-06-22 PROCEDURE — 80053 COMPREHEN METABOLIC PANEL: CPT | Mod: ER

## 2020-06-22 PROCEDURE — 80307 DRUG TEST PRSMV CHEM ANLYZR: CPT | Mod: ER

## 2020-06-22 PROCEDURE — 63600175 PHARM REV CODE 636 W HCPCS: Mod: ER | Performed by: EMERGENCY MEDICINE

## 2020-06-22 PROCEDURE — 11000001 HC ACUTE MED/SURG PRIVATE ROOM

## 2020-06-22 PROCEDURE — 81025 URINE PREGNANCY TEST: CPT | Mod: ER

## 2020-06-22 PROCEDURE — 83690 ASSAY OF LIPASE: CPT | Mod: ER

## 2020-06-22 PROCEDURE — 81003 URINALYSIS AUTO W/O SCOPE: CPT | Mod: 59,ER

## 2020-06-22 PROCEDURE — 96375 TX/PRO/DX INJ NEW DRUG ADDON: CPT | Mod: ER

## 2020-06-22 PROCEDURE — 99285 EMERGENCY DEPT VISIT HI MDM: CPT | Mod: 25,ER

## 2020-06-22 PROCEDURE — 85025 COMPLETE CBC W/AUTO DIFF WBC: CPT | Mod: ER

## 2020-06-22 PROCEDURE — 25500020 PHARM REV CODE 255: Mod: ER | Performed by: EMERGENCY MEDICINE

## 2020-06-22 RX ORDER — MORPHINE SULFATE 4 MG/ML
4 INJECTION, SOLUTION INTRAMUSCULAR; INTRAVENOUS
Status: COMPLETED | OUTPATIENT
Start: 2020-06-22 | End: 2020-06-22

## 2020-06-22 RX ORDER — ONDANSETRON 2 MG/ML
4 INJECTION INTRAMUSCULAR; INTRAVENOUS
Status: COMPLETED | OUTPATIENT
Start: 2020-06-22 | End: 2020-06-22

## 2020-06-22 RX ADMIN — ONDANSETRON 4 MG: 2 INJECTION INTRAMUSCULAR; INTRAVENOUS at 11:06

## 2020-06-22 RX ADMIN — MORPHINE SULFATE 4 MG: 4 INJECTION INTRAVENOUS at 11:06

## 2020-06-22 RX ADMIN — IOHEXOL 75 ML: 350 INJECTION, SOLUTION INTRAVENOUS at 11:06

## 2020-06-22 NOTE — LETTER
June 26, 2020         [id number] [place] [id number]  Phone: [phone]  Fax: [phone]       Patient: Alesasndra Sy   YOB: 1990  Date of Visit: 06/26/2020    To Whom It May Concern:    Chelsea Sy  was at [facility] on 06/26/2020. She may return to work on 6/29/20 with no restrictions. If you have any questions or concerns, or if I can be of further assistance, please do not hesitate to contact me.    Sincerely,          Candice Driver PA-C

## 2020-06-23 PROBLEM — N70.93 TUBO-OVARIAN ABSCESS: Status: ACTIVE | Noted: 2020-06-23

## 2020-06-23 LAB
AMPHET+METHAMPHET UR QL: NORMAL
BACTERIA GENITAL QL WET PREP: ABNORMAL
BARBITURATES UR QL SCN>200 NG/ML: NEGATIVE
BENZODIAZ UR QL SCN>200 NG/ML: NEGATIVE
BZE UR QL SCN: NEGATIVE
CANNABINOIDS UR QL SCN: NEGATIVE
CLUE CELLS VAG QL WET PREP: ABNORMAL
CREAT UR-MCNC: 188.8 MG/DL (ref 15–325)
FILAMENT FUNGI VAG WET PREP-#/AREA: ABNORMAL
GENTAMICIN SERPL-MCNC: <0.5 UG/ML
METHADONE UR QL SCN>300 NG/ML: NEGATIVE
OPIATES UR QL SCN: NEGATIVE
PCP UR QL SCN>25 NG/ML: NEGATIVE
SARS-COV-2 RDRP RESP QL NAA+PROBE: NEGATIVE
SPECIMEN SOURCE: ABNORMAL
T VAGINALIS GENITAL QL WET PREP: ABNORMAL
TOXICOLOGY INFORMATION: NORMAL
WBC #/AREA VAG WET PREP: ABNORMAL
YEAST GENITAL QL WET PREP: ABNORMAL

## 2020-06-23 PROCEDURE — 87210 SMEAR WET MOUNT SALINE/INK: CPT | Mod: ER

## 2020-06-23 PROCEDURE — 25000003 PHARM REV CODE 250: Performed by: PHYSICIAN ASSISTANT

## 2020-06-23 PROCEDURE — U0002 COVID-19 LAB TEST NON-CDC: HCPCS | Mod: ER

## 2020-06-23 PROCEDURE — 63600175 PHARM REV CODE 636 W HCPCS: Mod: ER | Performed by: EMERGENCY MEDICINE

## 2020-06-23 PROCEDURE — 25000003 PHARM REV CODE 250: Performed by: OBSTETRICS & GYNECOLOGY

## 2020-06-23 PROCEDURE — 36415 COLL VENOUS BLD VENIPUNCTURE: CPT

## 2020-06-23 PROCEDURE — S0028 INJECTION, FAMOTIDINE, 20 MG: HCPCS | Performed by: PHYSICIAN ASSISTANT

## 2020-06-23 PROCEDURE — 87491 CHLMYD TRACH DNA AMP PROBE: CPT

## 2020-06-23 PROCEDURE — 11000001 HC ACUTE MED/SURG PRIVATE ROOM

## 2020-06-23 PROCEDURE — 63600175 PHARM REV CODE 636 W HCPCS: Mod: ER | Performed by: OBSTETRICS & GYNECOLOGY

## 2020-06-23 PROCEDURE — 96365 THER/PROPH/DIAG IV INF INIT: CPT | Mod: ER

## 2020-06-23 PROCEDURE — 63600175 PHARM REV CODE 636 W HCPCS: Performed by: PHYSICIAN ASSISTANT

## 2020-06-23 PROCEDURE — 25000003 PHARM REV CODE 250: Mod: ER | Performed by: EMERGENCY MEDICINE

## 2020-06-23 PROCEDURE — 80170 ASSAY OF GENTAMICIN: CPT

## 2020-06-23 PROCEDURE — S0028 INJECTION, FAMOTIDINE, 20 MG: HCPCS | Performed by: OBSTETRICS & GYNECOLOGY

## 2020-06-23 PROCEDURE — 63600175 PHARM REV CODE 636 W HCPCS: Performed by: OBSTETRICS & GYNECOLOGY

## 2020-06-23 PROCEDURE — 96376 TX/PRO/DX INJ SAME DRUG ADON: CPT | Mod: ER

## 2020-06-23 PROCEDURE — 25000003 PHARM REV CODE 250: Mod: ER | Performed by: OBSTETRICS & GYNECOLOGY

## 2020-06-23 RX ORDER — MORPHINE SULFATE 4 MG/ML
4 INJECTION, SOLUTION INTRAMUSCULAR; INTRAVENOUS
Status: COMPLETED | OUTPATIENT
Start: 2020-06-23 | End: 2020-06-23

## 2020-06-23 RX ORDER — GENTAMICIN SULFATE 80 MG/100ML
160 INJECTION, SOLUTION INTRAVENOUS ONCE
Status: COMPLETED | OUTPATIENT
Start: 2020-06-23 | End: 2020-06-23

## 2020-06-23 RX ORDER — AMOXICILLIN 250 MG
1 CAPSULE ORAL 2 TIMES DAILY
Status: DISCONTINUED | OUTPATIENT
Start: 2020-06-23 | End: 2020-06-26 | Stop reason: HOSPADM

## 2020-06-23 RX ORDER — FLUOXETINE HYDROCHLORIDE 20 MG/1
20 CAPSULE ORAL DAILY
Status: DISCONTINUED | OUTPATIENT
Start: 2020-06-24 | End: 2020-06-26 | Stop reason: HOSPADM

## 2020-06-23 RX ORDER — CLINDAMYCIN PHOSPHATE 900 MG/50ML
900 INJECTION, SOLUTION INTRAVENOUS
Status: COMPLETED | OUTPATIENT
Start: 2020-06-23 | End: 2020-06-23

## 2020-06-23 RX ORDER — HYDROCODONE BITARTRATE AND ACETAMINOPHEN 10; 325 MG/1; MG/1
1 TABLET ORAL EVERY 6 HOURS PRN
Status: DISCONTINUED | OUTPATIENT
Start: 2020-06-23 | End: 2020-06-26 | Stop reason: HOSPADM

## 2020-06-23 RX ORDER — FAMOTIDINE 20 MG/50ML
20 INJECTION, SOLUTION INTRAVENOUS EVERY 12 HOURS
Status: DISCONTINUED | OUTPATIENT
Start: 2020-06-23 | End: 2020-06-26 | Stop reason: HOSPADM

## 2020-06-23 RX ORDER — HYDROCODONE BITARTRATE AND ACETAMINOPHEN 5; 325 MG/1; MG/1
1 TABLET ORAL EVERY 6 HOURS PRN
Status: DISCONTINUED | OUTPATIENT
Start: 2020-06-23 | End: 2020-06-26 | Stop reason: HOSPADM

## 2020-06-23 RX ORDER — SODIUM CHLORIDE 0.9 % (FLUSH) 0.9 %
10 SYRINGE (ML) INJECTION
Status: DISCONTINUED | OUTPATIENT
Start: 2020-06-23 | End: 2020-06-26 | Stop reason: HOSPADM

## 2020-06-23 RX ORDER — IBUPROFEN 600 MG/1
600 TABLET ORAL EVERY 6 HOURS PRN
Status: DISCONTINUED | OUTPATIENT
Start: 2020-06-23 | End: 2020-06-26 | Stop reason: HOSPADM

## 2020-06-23 RX ORDER — CLINDAMYCIN PHOSPHATE 900 MG/50ML
900 INJECTION, SOLUTION INTRAVENOUS
Status: DISCONTINUED | OUTPATIENT
Start: 2020-06-23 | End: 2020-06-26 | Stop reason: HOSPADM

## 2020-06-23 RX ORDER — PROCHLORPERAZINE EDISYLATE 5 MG/ML
5 INJECTION INTRAMUSCULAR; INTRAVENOUS EVERY 6 HOURS PRN
Status: DISCONTINUED | OUTPATIENT
Start: 2020-06-23 | End: 2020-06-26 | Stop reason: HOSPADM

## 2020-06-23 RX ORDER — MORPHINE SULFATE 4 MG/ML
2 INJECTION, SOLUTION INTRAMUSCULAR; INTRAVENOUS EVERY 4 HOURS PRN
Status: DISCONTINUED | OUTPATIENT
Start: 2020-06-23 | End: 2020-06-26 | Stop reason: HOSPADM

## 2020-06-23 RX ADMIN — GENTAMICIN SULFATE 418.8 MG: 40 INJECTION, SOLUTION INTRAMUSCULAR; INTRAVENOUS at 01:06

## 2020-06-23 RX ADMIN — FAMOTIDINE 20 MG: 20 INJECTION, SOLUTION INTRAVENOUS at 09:06

## 2020-06-23 RX ADMIN — CLINDAMYCIN IN 5 PERCENT DEXTROSE 900 MG: 18 INJECTION, SOLUTION INTRAVENOUS at 05:06

## 2020-06-23 RX ADMIN — AMPICILLIN SODIUM 2 G: 2 INJECTION, POWDER, FOR SOLUTION INTRAMUSCULAR; INTRAVENOUS at 03:06

## 2020-06-23 RX ADMIN — STANDARDIZED SENNA CONCENTRATE AND DOCUSATE SODIUM 1 TABLET: 8.6; 5 TABLET ORAL at 09:06

## 2020-06-23 RX ADMIN — MORPHINE SULFATE 2 MG: 4 INJECTION INTRAVENOUS at 06:06

## 2020-06-23 RX ADMIN — GENTAMICIN SULFATE 120 MG: 80 INJECTION, SOLUTION INTRAVENOUS at 02:06

## 2020-06-23 RX ADMIN — IBUPROFEN 600 MG: 200 TABLET, FILM COATED ORAL at 07:06

## 2020-06-23 RX ADMIN — CLINDAMYCIN IN 5 PERCENT DEXTROSE 900 MG: 18 INJECTION, SOLUTION INTRAVENOUS at 01:06

## 2020-06-23 RX ADMIN — MORPHINE SULFATE 2 MG: 4 INJECTION INTRAVENOUS at 07:06

## 2020-06-23 RX ADMIN — MORPHINE SULFATE 2 MG: 4 INJECTION INTRAVENOUS at 11:06

## 2020-06-23 RX ADMIN — STANDARDIZED SENNA CONCENTRATE AND DOCUSATE SODIUM 1 TABLET: 8.6; 5 TABLET ORAL at 10:06

## 2020-06-23 RX ADMIN — MORPHINE SULFATE 4 MG: 4 INJECTION INTRAVENOUS at 01:06

## 2020-06-23 RX ADMIN — CLINDAMYCIN IN 5 PERCENT DEXTROSE 900 MG: 18 INJECTION, SOLUTION INTRAVENOUS at 10:06

## 2020-06-23 RX ADMIN — HYDROCODONE BITARTRATE AND ACETAMINOPHEN 1 TABLET: 10; 325 TABLET ORAL at 03:06

## 2020-06-23 RX ADMIN — FAMOTIDINE 20 MG: 20 INJECTION, SOLUTION INTRAVENOUS at 10:06

## 2020-06-23 RX ADMIN — AMPICILLIN SODIUM 2 G: 2 INJECTION, POWDER, FOR SOLUTION INTRAMUSCULAR; INTRAVENOUS at 08:06

## 2020-06-23 RX ADMIN — AMPICILLIN SODIUM 2 G: 2 INJECTION, POWDER, FOR SOLUTION INTRAMUSCULAR; INTRAVENOUS at 09:06

## 2020-06-23 NOTE — SUBJECTIVE & OBJECTIVE
OB History    Para Term  AB Living   2 2 0 0 0 2   SAB TAB Ectopic Multiple Live Births   0 0 0 0 0      # Outcome Date GA Lbr Bradley/2nd Weight Sex Delivery Anes PTL Lv   2 Para            1 Para              Past Medical History:   Diagnosis Date    Bipolar 1 disorder     Borderline personality disorder     Depression     Gastric ulcer     GERD (gastroesophageal reflux disease)     Hx of ovarian cyst      Past Surgical History:   Procedure Laterality Date    NO PAST SURGERIES         PTA Medications   Medication Sig    dextroamphetamine-amphetamine (ADDERALL XR) 10 MG 24 hr capsule Take 10 mg by mouth 3 (three) times daily.     FLUoxetine 20 MG capsule Take 20 mg by mouth once daily.    buprenorphine-naloxone 2-0.5 mg (SUBOXONE) 2-0.5 mg Subl Place under the tongue every 6 (six) hours as needed.    cloNIDine (CATAPRES) 0.1 MG tablet Take 0.1 mg by mouth 2 (two) times daily.    guaiFENesin (MUCINEX) 600 mg 12 hr tablet Take 2 tablets (1,200 mg total) by mouth 2 (two) times daily.    naproxen (NAPROSYN) 500 MG tablet Take 1 tablet (500 mg total) by mouth 2 (two) times daily with meals. Prn pain    ondansetron (ZOFRAN-ODT) 4 MG TbDL Take 1 tablet (4 mg total) by mouth every 8 (eight) hours as needed (nausea/vomiting).    ondansetron (ZOFRAN-ODT) 4 MG TbDL Take 1 tablet (4 mg total) by mouth every 6 (six) hours as needed (nausea/vomiting).    predniSONE (DELTASONE) 20 MG tablet Take 1 tablet 3 times per day for 3 days, then Take 1 tablet 2 times per day for 3 days, then Take 1 tablet daily for 3 days       Review of patient's allergies indicates:  No Known Allergies     Family History     None        Tobacco Use    Smoking status: Current Every Day Smoker     Packs/day: 0.50     Types: Cigarettes    Smokeless tobacco: Never Used   Substance and Sexual Activity    Alcohol use: No    Drug use: No    Sexual activity: Yes     Partners: Female     Birth control/protection: None      Review of Systems   Constitutional: Positive for activity change, chills and fatigue. Negative for fever (subjective).   Respiratory: Negative for cough and shortness of breath.    Cardiovascular: Negative for chest pain and leg swelling.   Gastrointestinal: Positive for abdominal pain. Negative for constipation, diarrhea, nausea and vomiting.   Genitourinary: Positive for vaginal discharge. Negative for dysuria, frequency, hematuria, menorrhagia, menstrual problem, pelvic pain, urgency, vaginal bleeding and vaginal odor.   Integumentary:  Negative for rash.      Objective:     Vital Signs (Most Recent):  Temp: 98.5 °F (36.9 °C) (06/23/20 0936)  Pulse: 87 (06/23/20 0936)  Resp: 16 (06/23/20 1150)  BP: (!) 107/56 (06/23/20 0936)  SpO2: 100 % (06/23/20 0936) Vital Signs (24h Range):  Temp:  [98.2 °F (36.8 °C)-98.6 °F (37 °C)] 98.5 °F (36.9 °C)  Pulse:  [] 87  Resp:  [16-20] 16  SpO2:  [99 %-100 %] 100 %  BP: ()/(52-69) 107/56     Weight: 59.8 kg (131 lb 13.4 oz)  Body mass index is 20.96 kg/m².    No LMP recorded.    Physical Exam:   Constitutional: She is oriented to person, place, and time. She appears well-developed and well-nourished.   Appears in pain       Cardiovascular: Normal rate, regular rhythm and normal heart sounds.    No murmur heard.   Pulmonary/Chest: Effort normal and breath sounds normal. No respiratory distress. She has no wheezes. She has no rales.        Abdominal: Soft. Bowel sounds are normal. She exhibits distension. There is abdominal tenderness (diffuse tenderness L>R). There is guarding. There is no rebound.     Genitourinary:    Vagina normal.             Musculoskeletal: No edema.      Comments: No calf tenderness       Neurological: She is alert and oriented to person, place, and time.    Skin: Skin is warm and dry. No rash noted. She is not diaphoretic.        Laboratory:  CBC:   Recent Labs   Lab 06/22/20  2220   WBC 19.49*   RBC 4.27   HGB 13.5   HCT 40.2   *    MCV 94   MCH 31.6*   MCHC 33.6     CMP:   Recent Labs   Lab 06/22/20  2220   GLU 76   CALCIUM 9.2   ALBUMIN 3.9   PROT 7.7      K 3.5   CO2 27      BUN 8   CREATININE 0.7   ALKPHOS 54*   ALT 12   AST 18   BILITOT 1.0     Urine Pregnancy Test:   Recent Labs   Lab 06/22/20  2253   PREGTESTUR Negative       Diagnostic Results:  CT: Reviewed   Free fluid is seen in the pelvis. Complex cystic structure seen within the left adnexa. Tubo-ovarian abscess/pyosalpinx/hydrosalpinx or torsion not excluded.  Recommend follow-up pelvic ultrasound

## 2020-06-23 NOTE — PLAN OF CARE
AAO x 4. Up as tolerated. VSS. Pain controlled. Aseptic technique maintained. IV antibiotics. No distress noted. Call light in reach. Chart check completed.
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
## 2020-06-23 NOTE — PROGRESS NOTES
Pharmacokinetic Initial Assessment: Gentamicin    Assessment of levels:  Gentamicin, Random 6/23/20 at 1109 < 0.5 mcg/mL  Patient has sufficiently cleared the Gentamicin 2 mg/kg dose given 6/23 at 0253 to be able to begin extended interval dosing per pharmacy protocol.    Assessment:  Weight utilized for dose calculation: Actual Body Weight  Dosing method utilized: extended interval dosing    Plan: Extended interval dosing regimen: Gentamicin 7 mg/kg x 59.8 kg = 418.8 mg IV once, followed by a random level to be drawn on 6/23/20 at 2130, 8-12 hours after the first dose.    Pharmacy will continue to monitor.    Please contact pharmacy at extension [phone] with any questions regarding this assessment.    Thank you for the consult,    Kelly Mei PharmD       Patient brief summary:  Alessandra Sy is a 29 y.o. female initiated on aminoglycoside therapy for treatment of suspected tubo-ovarian abscess.    Drug Allergies:   Review of patient's allergies indicates:  No Known Allergies    Actual Body Weight (use for dosing): 59.8 kg    Adjusted Body Weight: 59.8 kg    Ideal Body Weight: 60.4 kg    Renal Function:   Estimated Creatinine Clearance: 114 mL/min (based on SCr of 0.7 mg/dL).,     Dialysis Method (if applicable):  N/A    CBC (last 72 hours):  Recent Labs   Lab Result Units 06/22/20  2220   WBC K/uL 19.49*   Hemoglobin g/dL 13.5   Hematocrit % 40.2   Platelets K/uL 420*   Gran% % 77.7*   Lymph% % 12.8*   Mono% % 7.9   Eosinophil% % 1.0   Basophil% % 0.3   Differential Method  Automated     Metabolic Panel (last 72 hours):  Recent Labs   Lab Result Units 06/22/20  2220 06/22/20  2253   Sodium mmol/L 138  --    Potassium mmol/L 3.5  --    Chloride mmol/L 101  --    CO2 mmol/L 27  --    Glucose mg/dL 76  --    Glucose, UA   --  Negative   BUN, Bld mg/dL 8  --    Creatinine mg/dL 0.7  --    Creatinine, Random Ur mg/dL  --  188.8   Albumin g/dL 3.9  --    Total Bilirubin mg/dL 1.0  --    Alkaline Phosphatase U/L  54*  --    AST U/L 18  --    ALT U/L 12  --      Microbiologic Results:  Microbiology Results (last 7 days)     Procedure Component Value Units Date/Time    C. trachomatis/N. gonorrhoeae by AMP DNA [[id number]] Collected: 06/23/20 0156    Order Status: Sent Specimen: Genital Updated: 06/23/20 1053        Thank you for allowing us to participate in this patient's care.     Kelly Mei, Charlie 06/23/2020 12:32 PM

## 2020-06-23 NOTE — HOSPITAL COURSE
The patient was suspected of having a L tubo-ovarian abscess and placed on triple antibiotic coverage with amp/gent/clinda. Her WBC trended down from 19 to 13 and she remained afebrile. Her pain persisted and the IUD was removed with improvement in pain. She was discharged on antibiotics to complete oral doxycycline and flagyl for 14 days.

## 2020-06-23 NOTE — HPI
"30 yo F  presents with complaint of abdominal pain. Reports pain for the past 2 days rapidly worsening, describes pain "everywhere" but worst pain is on the left side of abdomen. Sharp abdominal pains as well as aching in lower back. Reports several days of "small amount" of vaginal discharge. Last cycle normal LMP  duration 4 days without irregular vaginal bleeding. Has Mirena in place since .  She denies nausea/vomiting. She denies constipation/diarrhea or urinary symptoms. Reports subjective fever with chills/sweats.  "

## 2020-06-23 NOTE — PLAN OF CARE
CM met with patient at bedside to assess for discharge needs. Pt states that she lives at home with her significant other, and is independent with her needs. She denies using any medical equipment or having home health. She denies any post discharge needs at this time. CM provided a transitional care folder, information on advanced directives, information on pharmacy bedside delivery, and discharge planning begins on admission with contact information for any needs/questions.     D/C Plan: Home  PCP: ABEL Bhakta  Preferred Pharmacy: [facility]  Discharge transportation: Family  [facility]: Active  Pharmacy Bedside Delivery: Yes       06/23/20 7184   Discharge Assessment   Assessment Type Discharge Planning Assessment   Confirmed/corrected address and phone number on facesheet? Yes   Assessment information obtained from? Patient   Expected Length of Stay (days)   (TBD)   Communicated expected length of stay with patient/caregiver yes   Prior to hospitilization cognitive status: Alert/Oriented   Prior to hospitalization functional status: Independent   Current cognitive status: Alert/Oriented   Current Functional Status: Independent   Facility Arrived From: Home   Lives With significant other   Able to Return to Prior Arrangements yes   Is patient able to care for self after discharge? Yes   Who are your caregiver(s) and their phone number(s)? Alicia Sy, Significant Other- [phone]   Patient's perception of discharge disposition home or selfcare   Readmission Within the Last 30 Days no previous admission in last 30 days   Patient currently being followed by outpatient case management? No   Patient currently receives any other outside agency services? No   Equipment Currently Used at Home none   Do you have any problems affording any of your prescribed medications? No   Is the patient taking medications as prescribed? yes   Does the patient have transportation home? Yes   Transportation  Anticipated family or friend will provide   Dialysis Name and Scheduled days NA   Does the patient receive services at the [facility]? No   Discharge Plan A Home with family   DME Needed Upon Discharge  none   Patient/Family in Agreement with Plan yes

## 2020-06-23 NOTE — ED NOTES
Pt resting in bed. NAD, VSS, RR equal and unlabored. Will continue to monitor. Pt alert and oriented, calm/cooperative, and in NAD. PT c/o increasing abd pain with movement.

## 2020-06-23 NOTE — H&P
"[facility] -   Obstetrics & Gynecology  History & Physical    Patient Name: Alessandra Sy  MRN: [id number]  Admission Date: 2020  Primary Care Provider: ABEL Diggs    Subjective:     Chief Complaint/Reason for Admission: abdominal pain    History of Present Illness:  28 yo F  presents with complaint of abdominal pain. Reports pain for the past 2 days rapidly worsening, describes pain "everywhere" but worst pain is on the left side of abdomen. Sharp abdominal pains as well as aching in lower back. Reports several days of "small amount" of vaginal discharge. Last cycle normal LMP  duration 4 days without irregular vaginal bleeding. Has Mirena in place since .  She denies nausea/vomiting. She denies constipation/diarrhea or urinary symptoms. Reports subjective fever with chills/sweats.        OB History    Para Term  AB Living   2 2 0 0 0 2   SAB TAB Ectopic Multiple Live Births   0 0 0 0 0      # Outcome Date GA Lbr Bradley/2nd Weight Sex Delivery Anes PTL Lv   2 Para            1 Para              Past Medical History:   Diagnosis Date    Bipolar 1 disorder     Borderline personality disorder     Depression     Gastric ulcer     GERD (gastroesophageal reflux disease)     Hx of ovarian cyst      Past Surgical History:   Procedure Laterality Date    NO PAST SURGERIES         PTA Medications   Medication Sig    dextroamphetamine-amphetamine (ADDERALL XR) 10 MG 24 hr capsule Take 10 mg by mouth 3 (three) times daily.     FLUoxetine 20 MG capsule Take 20 mg by mouth once daily.    buprenorphine-naloxone 2-0.5 mg (SUBOXONE) 2-0.5 mg Subl Place under the tongue every 6 (six) hours as needed.    cloNIDine (CATAPRES) 0.1 MG tablet Take 0.1 mg by mouth 2 (two) times daily.    guaiFENesin (MUCINEX) 600 mg 12 hr tablet Take 2 tablets (1,200 mg total) by mouth 2 (two) times daily.    naproxen (NAPROSYN) 500 MG tablet Take 1 tablet (500 mg total) by mouth 2 " (two) times daily with meals. Prn pain    ondansetron (ZOFRAN-ODT) 4 MG TbDL Take 1 tablet (4 mg total) by mouth every 8 (eight) hours as needed (nausea/vomiting).    ondansetron (ZOFRAN-ODT) 4 MG TbDL Take 1 tablet (4 mg total) by mouth every 6 (six) hours as needed (nausea/vomiting).    predniSONE (DELTASONE) 20 MG tablet Take 1 tablet 3 times per day for 3 days, then Take 1 tablet 2 times per day for 3 days, then Take 1 tablet daily for 3 days       Review of patient's allergies indicates:  No Known Allergies     Family History     None        Tobacco Use    Smoking status: Current Every Day Smoker     Packs/day: 0.50     Types: Cigarettes    Smokeless tobacco: Never Used   Substance and Sexual Activity    Alcohol use: No    Drug use: No    Sexual activity: Yes     Partners: Female     Birth control/protection: None     Review of Systems   Constitutional: Positive for activity change, chills and fatigue. Negative for fever (subjective).   Respiratory: Negative for cough and shortness of breath.    Cardiovascular: Negative for chest pain and leg swelling.   Gastrointestinal: Positive for abdominal pain. Negative for constipation, diarrhea, nausea and vomiting.   Genitourinary: Positive for vaginal discharge. Negative for dysuria, frequency, hematuria, menorrhagia, menstrual problem, pelvic pain, urgency, vaginal bleeding and vaginal odor.   Integumentary:  Negative for rash.      Objective:     Vital Signs (Most Recent):  Temp: 98.5 °F (36.9 °C) (06/23/20 0936)  Pulse: 87 (06/23/20 0936)  Resp: 16 (06/23/20 1150)  BP: (!) 107/56 (06/23/20 0936)  SpO2: 100 % (06/23/20 0936) Vital Signs (24h Range):  Temp:  [98.2 °F (36.8 °C)-98.6 °F (37 °C)] 98.5 °F (36.9 °C)  Pulse:  [] 87  Resp:  [16-20] 16  SpO2:  [99 %-100 %] 100 %  BP: ()/(52-69) 107/56     Weight: 59.8 kg (131 lb 13.4 oz)  Body mass index is 20.96 kg/m².    No LMP recorded.    Physical Exam:   Constitutional: She is oriented to person,  place, and time. She appears well-developed and well-nourished.   Appears in pain       Cardiovascular: Normal rate, regular rhythm and normal heart sounds.    No murmur heard.   Pulmonary/Chest: Effort normal and breath sounds normal. No respiratory distress. She has no wheezes. She has no rales.        Abdominal: Soft. Bowel sounds are normal. She exhibits distension. There is abdominal tenderness (diffuse tenderness L>R). There is guarding. There is no rebound.     Genitourinary:    Vagina normal.             Musculoskeletal: No edema.      Comments: No calf tenderness       Neurological: She is alert and oriented to person, place, and time.    Skin: Skin is warm and dry. No rash noted. She is not diaphoretic.        Laboratory:  CBC:   Recent Labs   Lab 06/22/20  2220   WBC 19.49*   RBC 4.27   HGB 13.5   HCT 40.2   *   MCV 94   MCH 31.6*   MCHC 33.6     CMP:   Recent Labs   Lab 06/22/20  2220   GLU 76   CALCIUM 9.2   ALBUMIN 3.9   PROT 7.7      K 3.5   CO2 27      BUN 8   CREATININE 0.7   ALKPHOS 54*   ALT 12   AST 18   BILITOT 1.0     Urine Pregnancy Test:   Recent Labs   Lab 06/22/20  2253   PREGTESTUR Negative       Diagnostic Results:  CT: Reviewed   Free fluid is seen in the pelvis. Complex cystic structure seen within the left adnexa. Tubo-ovarian abscess/pyosalpinx/hydrosalpinx or torsion not excluded.  Recommend follow-up pelvic ultrasound    Assessment/Plan:     * Tubo-ovarian abscess  Suspect L TOA with WBC elevated to 19.4 and fluid collection observed on CT imaging  -continue amp/gent/clinda  -follow up GC/chlamydia  -trend WBC  -pending clinical response will reassess need for IUD removal        Candice Driver PA-C  Obstetrics & Gynecology  [facility]

## 2020-06-24 LAB
BASOPHILS # BLD AUTO: 0.06 K/UL (ref 0–0.2)
BASOPHILS NFR BLD: 0.5 % (ref 0–1.9)
DIFFERENTIAL METHOD: ABNORMAL
EOSINOPHIL # BLD AUTO: 0.2 K/UL (ref 0–0.5)
EOSINOPHIL NFR BLD: 1.7 % (ref 0–8)
ERYTHROCYTE [DISTWIDTH] IN BLOOD BY AUTOMATED COUNT: 13.4 % (ref 11.5–14.5)
GENTAMICIN SERPL-MCNC: 0.7 UG/ML
GENTAMICIN TROUGH SERPL-MCNC: <0.5 UG/ML (ref 0–2)
HCT VFR BLD AUTO: 35.5 % (ref 37–48.5)
HGB BLD-MCNC: 11.3 G/DL (ref 12–16)
IMM GRANULOCYTES # BLD AUTO: 0.07 K/UL (ref 0–0.04)
IMM GRANULOCYTES NFR BLD AUTO: 0.5 % (ref 0–0.5)
LYMPHOCYTES # BLD AUTO: 1.8 K/UL (ref 1–4.8)
LYMPHOCYTES NFR BLD: 13.7 % (ref 18–48)
MCH RBC QN AUTO: 30.7 PG (ref 27–31)
MCHC RBC AUTO-ENTMCNC: 31.8 G/DL (ref 32–36)
MCV RBC AUTO: 97 FL (ref 82–98)
MONOCYTES # BLD AUTO: 1.2 K/UL (ref 0.3–1)
MONOCYTES NFR BLD: 9.3 % (ref 4–15)
NEUTROPHILS # BLD AUTO: 9.7 K/UL (ref 1.8–7.7)
NEUTROPHILS NFR BLD: 74.3 % (ref 38–73)
NRBC BLD-RTO: 0 /100 WBC
PLATELET # BLD AUTO: 344 K/UL (ref 150–350)
PMV BLD AUTO: 9 FL (ref 9.2–12.9)
RBC # BLD AUTO: 3.68 M/UL (ref 4–5.4)
WBC # BLD AUTO: 13 K/UL (ref 3.9–12.7)

## 2020-06-24 PROCEDURE — 25000003 PHARM REV CODE 250: Performed by: PHYSICIAN ASSISTANT

## 2020-06-24 PROCEDURE — 80170 ASSAY OF GENTAMICIN: CPT | Mod: 91

## 2020-06-24 PROCEDURE — 63600175 PHARM REV CODE 636 W HCPCS: Performed by: PHYSICIAN ASSISTANT

## 2020-06-24 PROCEDURE — S0028 INJECTION, FAMOTIDINE, 20 MG: HCPCS | Performed by: PHYSICIAN ASSISTANT

## 2020-06-24 PROCEDURE — 99232 SBSQ HOSP IP/OBS MODERATE 35: CPT | Mod: ,,, | Performed by: OBSTETRICS & GYNECOLOGY

## 2020-06-24 PROCEDURE — 99232 PR SUBSEQUENT HOSPITAL CARE,LEVL II: ICD-10-PCS | Mod: ,,, | Performed by: OBSTETRICS & GYNECOLOGY

## 2020-06-24 PROCEDURE — 85025 COMPLETE CBC W/AUTO DIFF WBC: CPT

## 2020-06-24 PROCEDURE — 36415 COLL VENOUS BLD VENIPUNCTURE: CPT

## 2020-06-24 PROCEDURE — 80170 ASSAY OF GENTAMICIN: CPT

## 2020-06-24 PROCEDURE — 11000001 HC ACUTE MED/SURG PRIVATE ROOM

## 2020-06-24 RX ADMIN — AMPICILLIN SODIUM 2 G: 2 INJECTION, POWDER, FOR SOLUTION INTRAMUSCULAR; INTRAVENOUS at 08:06

## 2020-06-24 RX ADMIN — PROMETHAZINE HYDROCHLORIDE 6.25 MG: 25 INJECTION INTRAMUSCULAR; INTRAVENOUS at 11:06

## 2020-06-24 RX ADMIN — CLINDAMYCIN IN 5 PERCENT DEXTROSE 900 MG: 18 INJECTION, SOLUTION INTRAVENOUS at 01:06

## 2020-06-24 RX ADMIN — CLINDAMYCIN IN 5 PERCENT DEXTROSE 900 MG: 18 INJECTION, SOLUTION INTRAVENOUS at 05:06

## 2020-06-24 RX ADMIN — GENTAMICIN SULFATE 418.8 MG: 40 INJECTION, SOLUTION INTRAMUSCULAR; INTRAVENOUS at 01:06

## 2020-06-24 RX ADMIN — AMPICILLIN SODIUM 2 G: 2 INJECTION, POWDER, FOR SOLUTION INTRAMUSCULAR; INTRAVENOUS at 07:06

## 2020-06-24 RX ADMIN — STANDARDIZED SENNA CONCENTRATE AND DOCUSATE SODIUM 1 TABLET: 8.6; 5 TABLET ORAL at 08:06

## 2020-06-24 RX ADMIN — STANDARDIZED SENNA CONCENTRATE AND DOCUSATE SODIUM 1 TABLET: 8.6; 5 TABLET ORAL at 09:06

## 2020-06-24 RX ADMIN — AMPICILLIN SODIUM 2 G: 2 INJECTION, POWDER, FOR SOLUTION INTRAMUSCULAR; INTRAVENOUS at 02:06

## 2020-06-24 RX ADMIN — FAMOTIDINE 20 MG: 20 INJECTION, SOLUTION INTRAVENOUS at 09:06

## 2020-06-24 RX ADMIN — AMPICILLIN SODIUM 2 G: 2 INJECTION, POWDER, FOR SOLUTION INTRAMUSCULAR; INTRAVENOUS at 04:06

## 2020-06-24 RX ADMIN — FLUOXETINE 20 MG: 20 CAPSULE ORAL at 09:06

## 2020-06-24 RX ADMIN — CLINDAMYCIN IN 5 PERCENT DEXTROSE 900 MG: 18 INJECTION, SOLUTION INTRAVENOUS at 10:06

## 2020-06-24 RX ADMIN — MORPHINE SULFATE 2 MG: 4 INJECTION INTRAVENOUS at 08:06

## 2020-06-24 RX ADMIN — MORPHINE SULFATE 2 MG: 4 INJECTION INTRAVENOUS at 06:06

## 2020-06-24 RX ADMIN — HYDROCODONE BITARTRATE AND ACETAMINOPHEN 1 TABLET: 10; 325 TABLET ORAL at 11:06

## 2020-06-24 RX ADMIN — MORPHINE SULFATE 2 MG: 4 INJECTION INTRAVENOUS at 12:06

## 2020-06-24 RX ADMIN — HYDROCODONE BITARTRATE AND ACETAMINOPHEN 1 TABLET: 10; 325 TABLET ORAL at 05:06

## 2020-06-24 NOTE — ASSESSMENT & PLAN NOTE
Suspect L TOA with WBC elevated to 19.4 and fluid collection observed on CT imaging  -continue amp/gent/clinda  -follow up GC/chlamydia  -trend WBC  -pending clinical response will reassess need for IUD removal  6/23/--reports she is feeling much better; pt updated on care plan; continue amp/gent/clinda; gc/ct pending; cbc in am

## 2020-06-24 NOTE — PROGRESS NOTES
"[facility]  Obstetrics & Gynecology  Progress Note    Patient Name: Alessandra Sy  MRN: [id number]  Admission Date: 2020  Primary Care Provider: ABEL Diggs  Principal Problem: Tubo-ovarian abscess    Subjective:     HPI:  28 yo F  presents with complaint of abdominal pain. Reports pain for the past 2 days rapidly worsening, describes pain "everywhere" but worst pain is on the left side of abdomen. Sharp abdominal pains as well as aching in lower back. Reports several days of "small amount" of vaginal discharge. Last cycle normal LMP  duration 4 days without irregular vaginal bleeding. Has Mirena in place since .  She denies nausea/vomiting. She denies constipation/diarrhea or urinary symptoms. Reports subjective fever with chills/sweats.    No new subjective & objective note has been filed under this hospital service since the last note was generated.    Assessment/Plan:     * Tubo-ovarian abscess  Suspect L TOA with WBC elevated to 19.4 and fluid collection observed on CT imaging  -continue amp/gent/clinda  -follow up GC/chlamydia  -trend WBC  -pending clinical response will reassess need for IUD removal  20---reports she is feeling much better; pt updated on care plan; continue amp/gent/clinda; gc/ct pending; cbc in am        Jeny Lowe MD  Obstetrics & Gynecology  [facility]  "

## 2020-06-24 NOTE — PROGRESS NOTES
Pharmacokinetic Follow Up: Gentamicin    Assessment of levels:   The trough level was drawn correctly and can be used to guide therapy at this time    Gentamicin levels: The trough concentration was within targeted range of < 0.5 mcg/mL.     Regimen Plan:   Continue current dose: Gentamicin 418.8 mg IV every 24 hours.   Gentamicin trough due 6/26/2020 @ 1300 prior to 3rd dose.     Drug levels (last 3 results):  No results for input(s): AMIKACINPEAK, AMIKACINTROU, AMIKACINRAND, AMIKACIN in the last 72 hours.    No results for input(s): GENTAMICIN, GENTPEAK, GENTTROUGH, GENT10, GENT12, GENT8, GENTRANDOM in the last 72 hours.    No results for input(s): TOBRA8, TOBRA10, TOBRA12, TOBRARND, TOBRAMYCIN, TOBRAPEAK, TOBRATROUGH, TOBRAMYCINPE, TOBRAMYCINRA, TOBRAMYCINTR in the last 72 hours.    Pharmacy will continue to monitor.    Please contact pharmacy at extension [phone] with any questions regarding this assessment.    Thank you for the consult,   Teresa Mendes      Patient brief summary:  Alessandra Sy is a 29 y.o. female initiated on aminoglycoside therapy for treatment of tubo-ovarian abscess.     Drug Allergies:   Review of patient's allergies indicates:  No Known Allergies    Actual Body Weight:   59.8 Kg     Adjust Body Weight:   59.8 Kg     Ideal Body Weight:  60.4Kg     Renal Function:   Estimated Creatinine Clearance: 114 mL/min (based on SCr of 0.7 mg/dL).,     Dialysis Method (if applicable):  N/A    CBC (last 72 hours):  Recent Labs   Lab Result Units 06/22/20 2220 06/24/20  0441   WBC K/uL 19.49* 13.00*   Hemoglobin g/dL 13.5 11.3*   Hematocrit % 40.2 35.5*   Platelets K/uL 420* 344   Gran% % 77.7* 74.3*   Lymph% % 12.8* 13.7*   Mono% % 7.9 9.3   Eosinophil% % 1.0 1.7   Basophil% % 0.3 0.5   Differential Method  Automated Automated       Metabolic Panel (last 72 hours):  Recent Labs   Lab Result Units 06/22/20 2220 06/22/20  2253   Sodium mmol/L 138  --    Potassium mmol/L 3.5  --    Chloride  mmol/L 101  --    CO2 mmol/L 27  --    Glucose mg/dL 76  --    Glucose, UA   --  Negative   BUN, Bld mg/dL 8  --    Creatinine mg/dL 0.7  --    Creatinine, Random Ur mg/dL  --  188.8   Albumin g/dL 3.9  --    Total Bilirubin mg/dL 1.0  --    Alkaline Phosphatase U/L 54*  --    AST U/L 18  --    ALT U/L 12  --        Aminoglycoside Administrations:  aminoglycosides given in last 96 hours                     gentamicin (GARAMYCIN) 418.8 mg in sodium chloride 0.9% 100 mL IVPB (mg) 418.8 mg New Bag 06/24/20 1336     418.8 mg New Bag 06/23/20 1305    gentamicin 80 mg/100ml NACL IVPB IVPB 160 mg (mg) 120 mg New Bag 06/23/20 0253                    Microbiologic Results:  Microbiology Results (last 7 days)       Procedure Component Value Units Date/Time    C. trachomatis/N. gonorrhoeae by AMP DNA [[id number]] Collected: 06/23/20 0156    Order Status: Sent Specimen: Genital Updated: 06/23/20 105

## 2020-06-24 NOTE — PROGRESS NOTES
"[facility]  Obstetrics & Gynecology  Progress Note    Patient Name: Alessandra Sy  MRN: [id number]  Admission Date: 2020  Primary Care Provider: ABEL Diggs  Principal Problem: Tubo-ovarian abscess    Subjective:     HPI:  30 yo F  presents with complaint of abdominal pain. Reports pain for the past 2 days rapidly worsening, describes pain "everywhere" but worst pain is on the left side of abdomen. Sharp abdominal pains as well as aching in lower back. Reports several days of "small amount" of vaginal discharge. Last cycle normal LMP  duration 4 days without irregular vaginal bleeding. Has Mirena in place since .  She denies nausea/vomiting. She denies constipation/diarrhea or urinary symptoms. Reports subjective fever with chills/sweats.    Interval History: Patient reports she is doing slightly better today. She is ambulating well. Voiding well. No nausea or vomiting. +flatus and eating and tolerating diet. No fever overnight.       Scheduled Meds:   ampicillin IVPB  2 g Intravenous Q6H    clindamycin (CLEOCIN) IVPB  900 mg Intravenous Q8H    famotidine  20 mg Intravenous Q12H    FLUoxetine  20 mg Oral Daily    gentamicin  7 mg/kg Intravenous Q24H    senna-docusate 8.6-50 mg  1 tablet Oral BID     Continuous Infusions:  PRN Meds:HYDROcodone-acetaminophen, HYDROcodone-acetaminophen, ibuprofen, morphine, prochlorperazine, promethazine (PHENERGAN) IVPB, sodium chloride 0.9%    Review of patient's allergies indicates:  No Known Allergies    Objective:     Vital Signs (Most Recent):  Temp: 98.4 °F (36.9 °C) (20 0756)  Pulse: 87 (20 0756)  Resp: 16 (20 0812)  BP: (!) 107/58 (20 0756)  SpO2: 97 % (20 0756) Vital Signs (24h Range):  Temp:  [98.1 °F (36.7 °C)-98.7 °F (37.1 °C)] 98.4 °F (36.9 °C)  Pulse:  [80-89] 87  Resp:  [12-19] 16  SpO2:  [97 %-100 %] 97 %  BP: (100-122)/(56-68) 107/58     Weight: 59.8 kg (131 lb 13.4 oz)  Body mass " index is 20.96 kg/m².  No LMP recorded.    I&O (Last 24H):  No intake or output data in the 24 hours ending 06/24/20 0843    Physical Exam:   Constitutional: She is oriented to person, place, and time. She appears well-developed and well-nourished. No distress.       Cardiovascular: Normal rate, regular rhythm and normal heart sounds.    No murmur heard.   Pulmonary/Chest: Effort normal and breath sounds normal. No respiratory distress. She has no wheezes. She has no rales.        Abdominal: Soft. She exhibits no distension. There is abdominal tenderness. There is no guarding.   Sluggish but active bowel sounds. Decreased LLQ tenderness and distention from yesterday     Genitourinary:    Vagina normal.             Musculoskeletal: No edema.      Comments: No calf tenderness       Neurological: She is alert and oriented to person, place, and time.    Skin: Skin is warm and dry. No rash noted. She is not diaphoretic.        Laboratory:  I have personallly reviewed all pertinent lab results from the last 24 hours.    Diagnostic Results:  Labs: Reviewed    Assessment/Plan:     * Tubo-ovarian abscess  Suspect L TOA with WBC elevated to 19.4 and fluid collection observed on CT imaging  -continue amp/gent/clinda will likely need minimum 72 hours of IV antibiotics  -follow up GC/chlamydia  -WBC trending down and remains afebrile  -pending clinical response will reassess need for IUD removal           Candice Driver PA-C  Obstetrics & Gynecology  [facility]

## 2020-06-24 NOTE — PLAN OF CARE
Patient resting quietly at this time, patient remains injury free and pain controlled at this time.  IV antibiotics administered as ordered without any problems.  Will continue to monitor, administer meds as ordered, provide comfort/safety measures and notify MD of any changes in condition.

## 2020-06-24 NOTE — PROGRESS NOTES
Pharmacokinetic Follow Up: Gentamicin    Assessment of levels:   Random concentration of 0.7 mcg/mL (10.25 hours post-infusion) corresponds to a dosing interval of every 24 hours per the [employer] [employer]    Regimen Plan:   Continue current dose: Gentamicin 418.8 mg IV every 24 hours    Will check trough concentration 30 min prior to second dose on 6/24/20 at 1300.    Drug levels (last 3 results):  Gentamicin, Random 6/24/20 at 0010: 0.7 mcg/mL    Pharmacy will continue to monitor.    Please contact pharmacy at extension [phone] with any questions regarding this assessment.    Thank you for the consult,   Kelly Mei PharmD      Patient brief summary:  Alessandra Sy is a 29 y.o. female initiated on aminoglycoside therapy for treatment of tubo-ovarian abscess.    Drug Allergies:   Review of patient's allergies indicates:  No Known Allergies    Actual Body Weight (use for dosing): 59.8 kg     Adjusted Body Weight: 59.8 kg     Ideal Body Weight: 60.4 kg    Renal Function:   Estimated Creatinine Clearance: 114 mL/min (based on SCr of 0.7 mg/dL).,     Dialysis Method (if applicable):  N/A    CBC (last 72 hours):  Recent Labs   Lab Result Units 06/22/20 2220 06/24/20  0441   WBC K/uL 19.49* 13.00*   Hemoglobin g/dL 13.5 11.3*   Hematocrit % 40.2 35.5*   Platelets K/uL 420* 344   Gran% % 77.7* 74.3*   Lymph% % 12.8* 13.7*   Mono% % 7.9 9.3   Eosinophil% % 1.0 1.7   Basophil% % 0.3 0.5   Differential Method  Automated Automated       Metabolic Panel (last 72 hours):  Recent Labs   Lab Result Units 06/22/20 2220 06/22/20  2253   Sodium mmol/L 138  --    Potassium mmol/L 3.5  --    Chloride mmol/L 101  --    CO2 mmol/L 27  --    Glucose mg/dL 76  --    Glucose, UA   --  Negative   BUN, Bld mg/dL 8  --    Creatinine mg/dL 0.7  --    Creatinine, Random Ur mg/dL  --  188.8   Albumin g/dL 3.9  --    Total Bilirubin mg/dL 1.0  --    Alkaline Phosphatase U/L 54*  --    AST U/L 18  --    ALT U/L 12  --        Aminoglycoside  Administrations:  aminoglycosides given in last 96 hours                   gentamicin (GARAMYCIN) 418.8 mg in sodium chloride 0.9% 100 mL IVPB (mg) 418.8 mg New Bag 06/23/20 1305    gentamicin 80 mg/100ml NACL IVPB IVPB 160 mg (mg) 120 mg New Bag 06/23/20 0253                Microbiologic Results:  Microbiology Results (last 7 days)     Procedure Component Value Units Date/Time    C. trachomatis/N. gonorrhoeae by AMP DNA [[id number]] Collected: 06/23/20 0156    Order Status: Sent Specimen: Genital Updated: 06/23/20 1053        Thank you for allowing us to participate in this patient's care.     Kelly Mei, Charlie 06/24/2020 7:47 AM

## 2020-06-24 NOTE — SUBJECTIVE & OBJECTIVE
Interval History: Patient reports she is doing slightly better today. She is ambulating well. Voiding well. No nausea or vomiting. +flatus and eating and tolerating diet. No fever overnight.       Scheduled Meds:   ampicillin IVPB  2 g Intravenous Q6H    clindamycin (CLEOCIN) IVPB  900 mg Intravenous Q8H    famotidine  20 mg Intravenous Q12H    FLUoxetine  20 mg Oral Daily    gentamicin  7 mg/kg Intravenous Q24H    senna-docusate 8.6-50 mg  1 tablet Oral BID     Continuous Infusions:  PRN Meds:HYDROcodone-acetaminophen, HYDROcodone-acetaminophen, ibuprofen, morphine, prochlorperazine, promethazine (PHENERGAN) IVPB, sodium chloride 0.9%    Review of patient's allergies indicates:  No Known Allergies    Objective:     Vital Signs (Most Recent):  Temp: 98.4 °F (36.9 °C) (06/24/20 0756)  Pulse: 87 (06/24/20 0756)  Resp: 16 (06/24/20 0812)  BP: (!) 107/58 (06/24/20 0756)  SpO2: 97 % (06/24/20 0756) Vital Signs (24h Range):  Temp:  [98.1 °F (36.7 °C)-98.7 °F (37.1 °C)] 98.4 °F (36.9 °C)  Pulse:  [80-89] 87  Resp:  [12-19] 16  SpO2:  [97 %-100 %] 97 %  BP: (100-122)/(56-68) 107/58     Weight: 59.8 kg (131 lb 13.4 oz)  Body mass index is 20.96 kg/m².  No LMP recorded.    I&O (Last 24H):  No intake or output data in the 24 hours ending 06/24/20 0843    Physical Exam:   Constitutional: She is oriented to person, place, and time. She appears well-developed and well-nourished. No distress.       Cardiovascular: Normal rate, regular rhythm and normal heart sounds.    No murmur heard.   Pulmonary/Chest: Effort normal and breath sounds normal. No respiratory distress. She has no wheezes. She has no rales.        Abdominal: Soft. She exhibits no distension. There is abdominal tenderness. There is no guarding.   Sluggish but active bowel sounds. Decreased LLQ tenderness and distention from yesterday     Genitourinary:    Vagina normal.             Musculoskeletal: No edema.      Comments: No calf tenderness       Neurological:  She is alert and oriented to person, place, and time.    Skin: Skin is warm and dry. No rash noted. She is not diaphoretic.        Laboratory:  I have personallly reviewed all pertinent lab results from the last 24 hours.    Diagnostic Results:  Labs: Reviewed

## 2020-06-24 NOTE — ASSESSMENT & PLAN NOTE
Suspect L TOA with WBC elevated to 19.4 and fluid collection observed on CT imaging  -continue amp/gent/clinda will likely need minimum 72 hours of IV antibiotics  -follow up GC/chlamydia  -WBC trending down and remains afebrile  -pending clinical response will reassess need for IUD removal

## 2020-06-24 NOTE — PLAN OF CARE
CM met with patient at the bedside to discuss the discharge plan.  Patient has no CM discharge needs at this time.  Her wife will help her at home after discharge.     06/24/20 1607   Discharge Reassessment   Assessment Type Discharge Planning Reassessment   Provided patient/caregiver education on the expected discharge date and the discharge plan Yes   Do you have any problems affording any of your prescribed medications? No   Discharge Plan A Home with family   Discharge Plan B Home with family   DME Needed Upon Discharge  none   Patient choice form signed by patient/caregiver N/A   Anticipated Discharge Disposition Home   Can the patient/caregiver answer the patient profile reliably? Yes, cognitively intact

## 2020-06-24 NOTE — PLAN OF CARE
No adverse events during shift.  Remained free of falls. Call light in reach. Side rails x 2. Pain well tolerated w/ prn meds. Pt repositions independently. IV abx administered as ordered. VSS. Chart reviewed, will continue to monitor.

## 2020-06-25 LAB
C TRACH DNA SPEC QL NAA+PROBE: NOT DETECTED
N GONORRHOEA DNA SPEC QL NAA+PROBE: NOT DETECTED

## 2020-06-25 PROCEDURE — S0028 INJECTION, FAMOTIDINE, 20 MG: HCPCS | Performed by: PHYSICIAN ASSISTANT

## 2020-06-25 PROCEDURE — 25000003 PHARM REV CODE 250: Performed by: PHYSICIAN ASSISTANT

## 2020-06-25 PROCEDURE — 11000001 HC ACUTE MED/SURG PRIVATE ROOM

## 2020-06-25 PROCEDURE — 63600175 PHARM REV CODE 636 W HCPCS: Performed by: PHYSICIAN ASSISTANT

## 2020-06-25 RX ADMIN — CLINDAMYCIN IN 5 PERCENT DEXTROSE 900 MG: 18 INJECTION, SOLUTION INTRAVENOUS at 11:06

## 2020-06-25 RX ADMIN — STANDARDIZED SENNA CONCENTRATE AND DOCUSATE SODIUM 1 TABLET: 8.6; 5 TABLET ORAL at 08:06

## 2020-06-25 RX ADMIN — AMPICILLIN SODIUM 2 G: 2 INJECTION, POWDER, FOR SOLUTION INTRAMUSCULAR; INTRAVENOUS at 03:06

## 2020-06-25 RX ADMIN — GENTAMICIN SULFATE 418.8 MG: 40 INJECTION, SOLUTION INTRAMUSCULAR; INTRAVENOUS at 12:06

## 2020-06-25 RX ADMIN — AMPICILLIN SODIUM 2 G: 2 INJECTION, POWDER, FOR SOLUTION INTRAMUSCULAR; INTRAVENOUS at 09:06

## 2020-06-25 RX ADMIN — FAMOTIDINE 20 MG: 20 INJECTION, SOLUTION INTRAVENOUS at 08:06

## 2020-06-25 RX ADMIN — FLUOXETINE 20 MG: 20 CAPSULE ORAL at 08:06

## 2020-06-25 RX ADMIN — MORPHINE SULFATE 2 MG: 4 INJECTION INTRAVENOUS at 12:06

## 2020-06-25 RX ADMIN — CLINDAMYCIN IN 5 PERCENT DEXTROSE 900 MG: 18 INJECTION, SOLUTION INTRAVENOUS at 06:06

## 2020-06-25 RX ADMIN — HYDROCODONE BITARTRATE AND ACETAMINOPHEN 1 TABLET: 5; 325 TABLET ORAL at 09:06

## 2020-06-25 RX ADMIN — AMPICILLIN SODIUM 2 G: 2 INJECTION, POWDER, FOR SOLUTION INTRAMUSCULAR; INTRAVENOUS at 04:06

## 2020-06-25 RX ADMIN — CLINDAMYCIN IN 5 PERCENT DEXTROSE 900 MG: 18 INJECTION, SOLUTION INTRAVENOUS at 01:06

## 2020-06-25 NOTE — PLAN OF CARE
Fall precautions in place. Call light and personal items within reach. Educated patient on side effects of medication administered. Pt verbalized understanding. 24 hour order check done.  Will continue to monitor.

## 2020-06-25 NOTE — ASSESSMENT & PLAN NOTE
Suspect L TOA with WBC elevated to 19.4 and fluid collection observed on CT imaging  -continue amp/gent/clinda  -GC/chlamydia negative  -WBC trending down and remains afebrile  -is s/p IV antibiotics for nearly 72 hours with continued significant abdominal tenderness. Will plan to removed IUD

## 2020-06-25 NOTE — PROGRESS NOTES
"[facility]   Obstetrics & Gynecology  Progress Note    Patient Name: Alessandra Sy  MRN: [id number]  Admission Date: 2020  Primary Care Provider: ABEL Diggs  Principal Problem: Tubo-ovarian abscess    Subjective:     HPI:  30 yo F  presents with complaint of abdominal pain. Reports pain for the past 2 days rapidly worsening, describes pain "everywhere" but worst pain is on the left side of abdomen. Sharp abdominal pains as well as aching in lower back. Reports several days of "small amount" of vaginal discharge. Last cycle normal LMP  duration 4 days without irregular vaginal bleeding. Has Mirena in place since .  She denies nausea/vomiting. She denies constipation/diarrhea or urinary symptoms. Reports subjective fever with chills/sweats.    Interval History: Patient continues to report significant pain. No fever overnight. She is eating and tolerating diet without nausea/vomiting.       Scheduled Meds:   ampicillin IVPB  2 g Intravenous Q6H    clindamycin (CLEOCIN) IVPB  900 mg Intravenous Q8H    famotidine  20 mg Intravenous Q12H    FLUoxetine  20 mg Oral Daily    gentamicin  7 mg/kg Intravenous Q24H    senna-docusate 8.6-50 mg  1 tablet Oral BID     Continuous Infusions:  PRN Meds:HYDROcodone-acetaminophen, HYDROcodone-acetaminophen, ibuprofen, morphine, prochlorperazine, promethazine (PHENERGAN) IVPB, sodium chloride 0.9%    Review of patient's allergies indicates:  No Known Allergies    Objective:     Vital Signs (Most Recent):  Temp: 98.4 °F (36.9 °C) (20 1228)  Pulse: 87 (20 1228)  Resp: 18 (20 1256)  BP: (!) 104/58 (20 1228)  SpO2: 96 % (20 1228) Vital Signs (24h Range):  Temp:  [96.2 °F (35.7 °C)-98.4 °F (36.9 °C)] 98.4 °F (36.9 °C)  Pulse:  [65-97] 87  Resp:  [14-20] 18  SpO2:  [96 %-100 %] 96 %  BP: ()/(57-64) 104/58     Weight: 59.8 kg (131 lb 13.4 oz)  Body mass index is 20.96 kg/m².  No LMP recorded.    I&O (Last " 24H):    Intake/Output Summary (Last 24 hours) at 6/25/2020 1258  Last data filed at 6/25/2020 1119  Gross per 24 hour   Intake 1920 ml   Output --   Net 1920 ml       Physical Exam:   Constitutional: She is oriented to person, place, and time. She appears well-developed and well-nourished. No distress.       Cardiovascular: Normal rate, regular rhythm and normal heart sounds.    No murmur heard.   Pulmonary/Chest: Effort normal and breath sounds normal. No respiratory distress. She has no wheezes. She has no rales.        Abdominal: Soft. Bowel sounds are normal. She exhibits no distension. There is abdominal tenderness (diffuse tenderness L>R, still quite tender on exam). There is no guarding.     Genitourinary:    Vagina normal.             Musculoskeletal: No edema.       Neurological: She is alert and oriented to person, place, and time.    Skin: Skin is warm and dry. No rash noted. She is not diaphoretic.        Laboratory:  I have personallly reviewed all pertinent lab results from the last 24 hours.    Diagnostic Results:  CT: Reviewed    Assessment/Plan:     * Tubo-ovarian abscess  Suspect L TOA with WBC elevated to 19.4 and fluid collection observed on CT imaging  -continue amp/gent/clinda  -GC/chlamydia negative  -WBC trending down and remains afebrile  -is s/p IV antibiotics for nearly 72 hours with continued significant abdominal tenderness. Will plan to removed IUD           Candice Driver PA-C  Obstetrics & Gynecology  [facility]

## 2020-06-25 NOTE — SUBJECTIVE & OBJECTIVE
Interval History: Patient continues to report significant pain. No fever overnight. She is eating and tolerating diet without nausea/vomiting.       Scheduled Meds:   ampicillin IVPB  2 g Intravenous Q6H    clindamycin (CLEOCIN) IVPB  900 mg Intravenous Q8H    famotidine  20 mg Intravenous Q12H    FLUoxetine  20 mg Oral Daily    gentamicin  7 mg/kg Intravenous Q24H    senna-docusate 8.6-50 mg  1 tablet Oral BID     Continuous Infusions:  PRN Meds:HYDROcodone-acetaminophen, HYDROcodone-acetaminophen, ibuprofen, morphine, prochlorperazine, promethazine (PHENERGAN) IVPB, sodium chloride 0.9%    Review of patient's allergies indicates:  No Known Allergies    Objective:     Vital Signs (Most Recent):  Temp: 98.4 °F (36.9 °C) (06/25/20 1228)  Pulse: 87 (06/25/20 1228)  Resp: 18 (06/25/20 1256)  BP: (!) 104/58 (06/25/20 1228)  SpO2: 96 % (06/25/20 1228) Vital Signs (24h Range):  Temp:  [96.2 °F (35.7 °C)-98.4 °F (36.9 °C)] 98.4 °F (36.9 °C)  Pulse:  [65-97] 87  Resp:  [14-20] 18  SpO2:  [96 %-100 %] 96 %  BP: ()/(57-64) 104/58     Weight: 59.8 kg (131 lb 13.4 oz)  Body mass index is 20.96 kg/m².  No LMP recorded.    I&O (Last 24H):    Intake/Output Summary (Last 24 hours) at 6/25/2020 1258  Last data filed at 6/25/2020 1119  Gross per 24 hour   Intake 1920 ml   Output --   Net 1920 ml       Physical Exam:   Constitutional: She is oriented to person, place, and time. She appears well-developed and well-nourished. No distress.       Cardiovascular: Normal rate, regular rhythm and normal heart sounds.    No murmur heard.   Pulmonary/Chest: Effort normal and breath sounds normal. No respiratory distress. She has no wheezes. She has no rales.        Abdominal: Soft. Bowel sounds are normal. She exhibits no distension. There is abdominal tenderness (diffuse tenderness L>R, still quite tender on exam). There is no guarding.     Genitourinary:    Vagina normal.             Musculoskeletal: No edema.       Neurological:  She is alert and oriented to person, place, and time.    Skin: Skin is warm and dry. No rash noted. She is not diaphoretic.        Laboratory:  I have personallly reviewed all pertinent lab results from the last 24 hours.    Diagnostic Results:  CT: Reviewed

## 2020-06-26 VITALS
BODY MASS INDEX: 21.18 KG/M2 | OXYGEN SATURATION: 98 % | SYSTOLIC BLOOD PRESSURE: 106 MMHG | TEMPERATURE: 99 F | HEART RATE: 73 BPM | RESPIRATION RATE: 16 BRPM | DIASTOLIC BLOOD PRESSURE: 57 MMHG | WEIGHT: 131.81 LBS | HEIGHT: 66 IN

## 2020-06-26 LAB
ALBUMIN SERPL BCP-MCNC: 3 G/DL (ref 3.5–5.2)
ALP SERPL-CCNC: 83 U/L (ref 55–135)
ALT SERPL W/O P-5'-P-CCNC: 31 U/L (ref 10–44)
ANION GAP SERPL CALC-SCNC: 10 MMOL/L (ref 8–16)
AST SERPL-CCNC: 22 U/L (ref 10–40)
BILIRUB SERPL-MCNC: 0.2 MG/DL (ref 0.1–1)
BUN SERPL-MCNC: 6 MG/DL (ref 6–20)
CALCIUM SERPL-MCNC: 8.5 MG/DL (ref 8.7–10.5)
CHLORIDE SERPL-SCNC: 105 MMOL/L (ref 95–110)
CO2 SERPL-SCNC: 25 MMOL/L (ref 23–29)
CREAT SERPL-MCNC: 0.8 MG/DL (ref 0.5–1.4)
EST. GFR  (AFRICAN AMERICAN): >60 ML/MIN/1.73 M^2
EST. GFR  (NON AFRICAN AMERICAN): >60 ML/MIN/1.73 M^2
GLUCOSE SERPL-MCNC: 135 MG/DL (ref 70–110)
POTASSIUM SERPL-SCNC: 4 MMOL/L (ref 3.5–5.1)
PROT SERPL-MCNC: 6.5 G/DL (ref 6–8.4)
SODIUM SERPL-SCNC: 140 MMOL/L (ref 136–145)

## 2020-06-26 PROCEDURE — 25000003 PHARM REV CODE 250: Performed by: PHYSICIAN ASSISTANT

## 2020-06-26 PROCEDURE — 36415 COLL VENOUS BLD VENIPUNCTURE: CPT

## 2020-06-26 PROCEDURE — 99232 SBSQ HOSP IP/OBS MODERATE 35: CPT | Mod: ,,, | Performed by: OBSTETRICS & GYNECOLOGY

## 2020-06-26 PROCEDURE — 63600175 PHARM REV CODE 636 W HCPCS: Performed by: PHYSICIAN ASSISTANT

## 2020-06-26 PROCEDURE — 80053 COMPREHEN METABOLIC PANEL: CPT

## 2020-06-26 PROCEDURE — 99232 PR SUBSEQUENT HOSPITAL CARE,LEVL II: ICD-10-PCS | Mod: ,,, | Performed by: OBSTETRICS & GYNECOLOGY

## 2020-06-26 RX ORDER — DOXYCYCLINE HYCLATE 100 MG
100 TABLET ORAL 2 TIMES DAILY
Qty: 28 TABLET | Refills: 0 | Status: SHIPPED | OUTPATIENT
Start: 2020-06-26 | End: 2020-07-10

## 2020-06-26 RX ORDER — METRONIDAZOLE 500 MG/1
500 TABLET ORAL EVERY 12 HOURS
Qty: 28 TABLET | Refills: 0 | Status: SHIPPED | OUTPATIENT
Start: 2020-06-26 | End: 2020-07-10

## 2020-06-26 RX ADMIN — AMPICILLIN SODIUM 2 G: 2 INJECTION, POWDER, FOR SOLUTION INTRAMUSCULAR; INTRAVENOUS at 02:06

## 2020-06-26 RX ADMIN — CLINDAMYCIN IN 5 PERCENT DEXTROSE 900 MG: 18 INJECTION, SOLUTION INTRAVENOUS at 01:06

## 2020-06-26 NOTE — PROGRESS NOTES
"[facility]   Obstetrics & Gynecology  Progress Note    Patient Name: Alessandra Sy  MRN: [id number]  Admission Date: 2020  Primary Care Provider: ABEL Diggs  Principal Problem: Tubo-ovarian abscess    Subjective:     HPI:  28 yo F  presents with complaint of abdominal pain. Reports pain for the past 2 days rapidly worsening, describes pain "everywhere" but worst pain is on the left side of abdomen. Sharp abdominal pains as well as aching in lower back. Reports several days of "small amount" of vaginal discharge. Last cycle normal LMP  duration 4 days without irregular vaginal bleeding. Has Mirena in place since .  She denies nausea/vomiting. She denies constipation/diarrhea or urinary symptoms. Reports subjective fever with chills/sweats.    Interval History: Patient reports she is doing well. She is ambulating well. Voiding well. No nausea or vomiting. Passing flatus and had a stool. She is eating and tolerating diet. No fever overnight. Pain is nearly resolved and reports since IUD removed she just has soreness. Experienced vaginal bleeding immediately after IUD removal but this has resolved as well.      Scheduled Meds:   ampicillin IVPB  2 g Intravenous Q6H    clindamycin (CLEOCIN) IVPB  900 mg Intravenous Q8H    famotidine  20 mg Intravenous Q12H    FLUoxetine  20 mg Oral Daily    gentamicin  7 mg/kg Intravenous Q24H    senna-docusate 8.6-50 mg  1 tablet Oral BID     Continuous Infusions:  PRN Meds:HYDROcodone-acetaminophen, HYDROcodone-acetaminophen, ibuprofen, morphine, prochlorperazine, promethazine (PHENERGAN) IVPB, sodium chloride 0.9%    Review of patient's allergies indicates:  No Known Allergies    Objective:     Vital Signs (Most Recent):  Temp: 98.6 °F (37 °C) (20)  Pulse: 73 (20)  Resp: 16 (20)  BP: (!) 106/57 (20)  SpO2: 98 % (20) Vital Signs (24h Range):  Temp:  [98.4 °F (36.9 °C)-99.4 °F " "(37.4 °C)] 98.6 °F (37 °C)  Pulse:  [73-88] 73  Resp:  [16-20] 16  SpO2:  [96 %-100 %] 98 %  BP: (104-117)/(57-68) 106/57     Weight: 59.8 kg (131 lb 13.4 oz)  Body mass index is 20.96 kg/m².  No LMP recorded.    I&O (Last 24H):    Intake/Output Summary (Last 24 hours) at 6/26/2020 0841  Last data filed at 6/26/2020 0500  Gross per 24 hour   Intake 1640 ml   Output --   Net 1640 ml       Physical Exam:   Constitutional: She is oriented to person, place, and time. She appears well-developed and well-nourished. No distress.       Cardiovascular: Normal rate, regular rhythm and normal heart sounds.    No murmur heard.   Pulmonary/Chest: Effort normal and breath sounds normal. No respiratory distress. She has no wheezes. She has no rales.        Abdominal: Soft. Bowel sounds are normal. She exhibits no distension. There is no abdominal tenderness (no tenderness, patient reports "just sore"). There is no guarding.     Genitourinary:    Vagina normal.             Musculoskeletal: No edema.      Comments: No calf tenderness       Neurological: She is alert and oriented to person, place, and time.    Skin: Skin is warm and dry. No rash noted. She is not diaphoretic.        Laboratory:  I have personallly reviewed all pertinent lab results from the last 24 hours.    Diagnostic Results:  CT: Reviewed    Assessment/Plan:     * Tubo-ovarian abscess  Suspect L TOA with WBC elevated to 19.4 and fluid collection observed on CT imaging  -improved on amp/gent/clinda  -GC/chlamydia negative  -WBC trending down and remains afebrile  -IUD removed and pain is much improved. Plan for discharge home today on doxycycline and flagyl for 14 days and have patient follow up in 2 weeks            Candice Driver PA-C  Obstetrics & Gynecology  [facility]  "

## 2020-06-26 NOTE — ASSESSMENT & PLAN NOTE
Suspect L TOA with WBC elevated to 19.4 and fluid collection observed on CT imaging  -improved on amp/gent/clinda  -GC/chlamydia negative  -WBC trending down and remains afebrile  -IUD removed and pain is much improved. Plan for discharge home today on doxycycline and flagyl for 14 days and have patient follow up in 2 weeks

## 2020-06-26 NOTE — SUBJECTIVE & OBJECTIVE
Interval History: Patient reports she is doing well. She is ambulating well. Voiding well. No nausea or vomiting. Passing flatus and had a stool. She is eating and tolerating diet. No fever overnight. Pain is nearly resolved and reports since IUD removed she just has soreness. Experienced vaginal bleeding immediately after IUD removal but this has resolved as well.      Scheduled Meds:   ampicillin IVPB  2 g Intravenous Q6H    clindamycin (CLEOCIN) IVPB  900 mg Intravenous Q8H    famotidine  20 mg Intravenous Q12H    FLUoxetine  20 mg Oral Daily    gentamicin  7 mg/kg Intravenous Q24H    senna-docusate 8.6-50 mg  1 tablet Oral BID     Continuous Infusions:  PRN Meds:HYDROcodone-acetaminophen, HYDROcodone-acetaminophen, ibuprofen, morphine, prochlorperazine, promethazine (PHENERGAN) IVPB, sodium chloride 0.9%    Review of patient's allergies indicates:  No Known Allergies    Objective:     Vital Signs (Most Recent):  Temp: 98.6 °F (37 °C) (06/26/20 0742)  Pulse: 73 (06/26/20 0742)  Resp: 16 (06/26/20 0742)  BP: (!) 106/57 (06/26/20 0742)  SpO2: 98 % (06/26/20 0742) Vital Signs (24h Range):  Temp:  [98.4 °F (36.9 °C)-99.4 °F (37.4 °C)] 98.6 °F (37 °C)  Pulse:  [73-88] 73  Resp:  [16-20] 16  SpO2:  [96 %-100 %] 98 %  BP: (104-117)/(57-68) 106/57     Weight: 59.8 kg (131 lb 13.4 oz)  Body mass index is 20.96 kg/m².  No LMP recorded.    I&O (Last 24H):    Intake/Output Summary (Last 24 hours) at 6/26/2020 0827  Last data filed at 6/26/2020 0500  Gross per 24 hour   Intake 1640 ml   Output --   Net 1640 ml       Physical Exam:   Constitutional: She is oriented to person, place, and time. She appears well-developed and well-nourished. No distress.       Cardiovascular: Normal rate, regular rhythm and normal heart sounds.    No murmur heard.   Pulmonary/Chest: Effort normal and breath sounds normal. No respiratory distress. She has no wheezes. She has no rales.        Abdominal: Soft. Bowel sounds are normal. She  "exhibits no distension. There is no abdominal tenderness (no tenderness, patient reports "just sore"). There is no guarding.     Genitourinary:    Vagina normal.             Musculoskeletal: No edema.      Comments: No calf tenderness       Neurological: She is alert and oriented to person, place, and time.    Skin: Skin is warm and dry. No rash noted. She is not diaphoretic.        Laboratory:  I have personallly reviewed all pertinent lab results from the last 24 hours.    Diagnostic Results:  CT: Reviewed  "

## 2020-06-26 NOTE — DISCHARGE SUMMARY
"[facility] -   Obstetrics & Gynecology  Discharge Summary    Patient Name: Alessandra Sy  MRN: [id number]  Admission Date: 2020  Hospital Length of Stay: 3 days  Discharge Date and Time:  2020 8:37 AM  Attending Physician: Shruti Pena MD   Discharging Provider: Candice Driver PA-C  Primary Care Provider: ABEL Diggs    HPI:  30 yo F  presents with complaint of abdominal pain. Reports pain for the past 2 days rapidly worsening, describes pain "everywhere" but worst pain is on the left side of abdomen. Sharp abdominal pains as well as aching in lower back. Reports several days of "small amount" of vaginal discharge. Last cycle normal LMP  duration 4 days without irregular vaginal bleeding. Has Mirena in place since .  She denies nausea/vomiting. She denies constipation/diarrhea or urinary symptoms. Reports subjective fever with chills/sweats.    Hospital Course:  The patient was suspected of having a L tubo-ovarian abscess and placed on triple antibiotic coverage with amp/gent/clinda. Her WBC trended down from 19 to 13 and she remained afebrile. Her pain persisted and the IUD was removed with improvement in pain. She was discharged on antibiotics to complete oral doxycycline and flagyl for 14 days.    * No surgery found *     Consults (From admission, onward)        Status Ordering Provider     Pharmacy to dose Aminoglycosides consult  Once     Provider:  (Not yet assigned)    Acknowledged CANDICE DRIVER          Significant Diagnostic Studies: Labs: All labs within the past 24 hours have been reviewed      Pending Diagnostic Studies:     None        Final Active Diagnoses:    Diagnosis Date Noted POA    PRINCIPAL PROBLEM:  Tubo-ovarian abscess [N70.93] 2020 Yes      Problems Resolved During this Admission:        Discharged Condition: good    Disposition: Home or Self Care    Follow Up:  Follow-up Information     [facility] In 2 weeks.  "   Specialty: Obstetrics and Gynecology  Why: PID hospital follow up  Contact information:  [address] [facility] [address] [phone]  Additional information:  (off [facility]) 3rd floor               Patient Instructions:      Diet Adult Regular     Other restrictions (specify):   Order Comments: Pelvic rest: nothing in the vagina, including douching, tampons, or intercourse for 2 weeks     Notify your health care provider if you experience any of the following:  persistent nausea and vomiting or diarrhea     Notify your health care provider if you experience any of the following:  severe uncontrolled pain     Notify your health care provider if you experience any of the following:  difficulty breathing or increased cough     Notify your health care provider if you experience any of the following:  increased confusion or weakness     Notify your health care provider if you experience any of the following:   Order Comments: Heavy vaginal bleeding.     Medications:  Reconciled Home Medications:      Medication List      START taking these medications    doxycycline 100 MG tablet  Commonly known as: VIBRA-TABS  Take 1 tablet (100 mg total) by mouth 2 (two) times daily. Note to Pharmacy: Can substitute for Monodox if needed  for 14 days     metroNIDAZOLE 500 MG tablet  Commonly known as: FlagyL  Take 1 tablet (500 mg total) by mouth every 12 (twelve) hours. for 14 days        CHANGE how you take these medications    ondansetron 4 MG Tbdl  Commonly known as: ZOFRAN-ODT  Take 1 tablet (4 mg total) by mouth every 8 (eight) hours as needed (nausea/vomiting).  What changed: Another medication with the same name was removed. Continue taking this medication, and follow the directions you see here.        CONTINUE taking these medications    buprenorphine-naloxone 2-0.5 mg 2-0.5 mg Subl  Commonly known as: SUBOXONE  Place under the tongue every 6 (six) hours as needed.     cloNIDine 0.1 MG  tablet  Commonly known as: CATAPRES  Take 0.1 mg by mouth 2 (two) times daily.     dextroamphetamine-amphetamine 10 MG 24 hr capsule  Commonly known as: ADDERALL XR  Take 10 mg by mouth 3 (three) times daily.     FLUoxetine 20 MG capsule  Take 20 mg by mouth once daily.     naproxen 500 MG tablet  Commonly known as: NAPROSYN  Take 1 tablet (500 mg total) by mouth 2 (two) times daily with meals. Prn pain        STOP taking these medications    guaiFENesin 600 mg 12 hr tablet  Commonly known as: MUCINEX     predniSONE 20 MG tablet  Commonly known as: DELTASONE            Candice Driver PA-C  Obstetrics & Gynecology  [facility] -

## 2020-06-26 NOTE — PLAN OF CARE
06/26/20 1455   Final Note   Assessment Type Final Discharge Note   Anticipated Discharge Disposition Home   Right Care Referral Info   Post Acute Recommendation No Care

## 2020-06-26 NOTE — PLAN OF CARE
Problem: Adult Inpatient Plan of Care  Goal: Plan of Care Review  Outcome: Ongoing, Progressing  Flowsheets (Taken 6/26/2020 0046)  Plan of Care Reviewed With: patient   Pt had no adverse events during shift. Pt free of falls. Call light in reach. Side rails x 2. Pain well controlled w/ prn meds. Pt repositions independently. IV abx administered as ordered. VSS. Chart reviewed, will continue to monitor.

## 2021-04-28 ENCOUNTER — PATIENT MESSAGE (OUTPATIENT)
Dept: RESEARCH | Facility: HOSPITAL | Age: 31
End: 2021-04-28

## 2025-02-07 NOTE — ASSESSMENT & PLAN NOTE
Spoke with pharmacy at [facility]. The medication is wrong it needs to be in vials not pens. Request has already been sent to Dr. Ramirez.    Suspect L TOA with WBC elevated to 19.4 and fluid collection observed on CT imaging  -continue amp/gent/clinda  -follow up GC/chlamydia  -trend WBC  -pending clinical response will reassess need for IUD removal   no